# Patient Record
Sex: FEMALE | Race: OTHER | URBAN - METROPOLITAN AREA
[De-identification: names, ages, dates, MRNs, and addresses within clinical notes are randomized per-mention and may not be internally consistent; named-entity substitution may affect disease eponyms.]

---

## 2018-07-16 ENCOUNTER — OFFICE VISIT (OUTPATIENT)
Dept: FAMILY MEDICINE CLINIC | Age: 48
End: 2018-07-16

## 2018-07-16 VITALS
HEART RATE: 89 BPM | RESPIRATION RATE: 12 BRPM | SYSTOLIC BLOOD PRESSURE: 121 MMHG | TEMPERATURE: 98.4 F | BODY MASS INDEX: 26.62 KG/M2 | WEIGHT: 135.6 LBS | OXYGEN SATURATION: 97 % | HEIGHT: 60 IN | DIASTOLIC BLOOD PRESSURE: 82 MMHG

## 2018-07-16 DIAGNOSIS — Z01.419 WELL WOMAN EXAM: ICD-10-CM

## 2018-07-16 DIAGNOSIS — R35.0 URINARY FREQUENCY: ICD-10-CM

## 2018-07-16 DIAGNOSIS — Z01.419 WELL WOMAN EXAM: Primary | ICD-10-CM

## 2018-07-16 LAB
A/G RATIO: 1.7 (ref 1.1–2.2)
ALBUMIN SERPL-MCNC: 4.4 G/DL (ref 3.4–5)
ALP BLD-CCNC: 56 U/L (ref 40–129)
ALT SERPL-CCNC: 7 U/L (ref 10–40)
ANION GAP SERPL CALCULATED.3IONS-SCNC: 15 MMOL/L (ref 3–16)
AST SERPL-CCNC: 15 U/L (ref 15–37)
BILIRUB SERPL-MCNC: 0.5 MG/DL (ref 0–1)
BILIRUBIN URINE: NEGATIVE
BLOOD, URINE: ABNORMAL
BUN BLDV-MCNC: 11 MG/DL (ref 7–20)
CALCIUM SERPL-MCNC: 9.1 MG/DL (ref 8.3–10.6)
CHLORIDE BLD-SCNC: 100 MMOL/L (ref 99–110)
CHOLESTEROL, TOTAL: 198 MG/DL (ref 0–199)
CLARITY: CLEAR
CO2: 23 MMOL/L (ref 21–32)
COLOR: YELLOW
CREAT SERPL-MCNC: 0.6 MG/DL (ref 0.6–1.1)
EPITHELIAL CELLS, UA: 3 /HPF (ref 0–5)
GFR AFRICAN AMERICAN: >60
GFR NON-AFRICAN AMERICAN: >60
GLOBULIN: 2.6 G/DL
GLUCOSE BLD-MCNC: 86 MG/DL (ref 70–99)
GLUCOSE URINE: NEGATIVE MG/DL
HCT VFR BLD CALC: 39.1 % (ref 36–48)
HDLC SERPL-MCNC: 44 MG/DL (ref 40–60)
HEMOGLOBIN: 12.9 G/DL (ref 12–16)
HYALINE CASTS: 0 /LPF (ref 0–8)
KETONES, URINE: NEGATIVE MG/DL
LDL CHOLESTEROL CALCULATED: 106 MG/DL
LEUKOCYTE ESTERASE, URINE: NEGATIVE
MCH RBC QN AUTO: 30.2 PG (ref 26–34)
MCHC RBC AUTO-ENTMCNC: 33.1 G/DL (ref 31–36)
MCV RBC AUTO: 91.2 FL (ref 80–100)
MICROSCOPIC EXAMINATION: YES
NITRITE, URINE: NEGATIVE
PDW BLD-RTO: 13 % (ref 12.4–15.4)
PH UA: 6
PLATELET # BLD: 315 K/UL (ref 135–450)
PMV BLD AUTO: 7.6 FL (ref 5–10.5)
POTASSIUM SERPL-SCNC: 4.2 MMOL/L (ref 3.5–5.1)
PROTEIN UA: NEGATIVE MG/DL
RBC # BLD: 4.28 M/UL (ref 4–5.2)
RBC UA: 5 /HPF (ref 0–4)
SODIUM BLD-SCNC: 138 MMOL/L (ref 136–145)
SPECIFIC GRAVITY UA: 1.02
TOTAL PROTEIN: 7 G/DL (ref 6.4–8.2)
TRIGL SERPL-MCNC: 242 MG/DL (ref 0–150)
TSH SERPL DL<=0.05 MIU/L-ACNC: 2.29 UIU/ML (ref 0.27–4.2)
URINE TYPE: ABNORMAL
UROBILINOGEN, URINE: 0.2 E.U./DL
VLDLC SERPL CALC-MCNC: 48 MG/DL
WBC # BLD: 7.7 K/UL (ref 4–11)
WBC UA: 1 /HPF (ref 0–5)

## 2018-07-16 PROCEDURE — 99396 PREV VISIT EST AGE 40-64: CPT | Performed by: FAMILY MEDICINE

## 2018-07-16 ASSESSMENT — PATIENT HEALTH QUESTIONNAIRE - PHQ9
1. LITTLE INTEREST OR PLEASURE IN DOING THINGS: 0
2. FEELING DOWN, DEPRESSED OR HOPELESS: 0
SUM OF ALL RESPONSES TO PHQ9 QUESTIONS 1 & 2: 0
SUM OF ALL RESPONSES TO PHQ QUESTIONS 1-9: 0

## 2018-07-16 NOTE — PROGRESS NOTES
Wt 135 lb 9.6 oz (61.5 kg)   LMP 06/26/2018 (Approximate)   SpO2 97%   Breastfeeding? No   BMI 26.93 kg/m²   General appearance - healthy, alert, no distress  Skin - Skin color, texture, turgor normal. No rashes or lesions. No suspicious findings  Head - Normocephalic. No masses, lesions, tenderness or abnormalities  Eyes - conjunctivae/corneas clear. Pupils equal and reactive to light and accomodation, extraocular muscles intact. Ears - External ears normal. Canals clear. Tympanic membranes normal bilaterally. Nose/Sinuses - Nares normal. Septum midline. Mucosa normal. No drainage or sinus tenderness. Oropharynx - Lips, mucosa, and tongue normal. Teeth and gums normal.   Neck - Neck supple. No adenopathy. Thyroid symmetric, normal size, no carotid bruit bilaterally  Back - Back symmetric, no curvature. Range of motion normal. No Costovertebral angle tenderness. Lungs - Percussion normal. Good diaphragmatic excursion. Lungs clear without wheeze, rales, crackles  Heart - Regular rate and rhythm, with no rub, murmur or gallop noted. Abdomen - Abdomen soft, non-tender. Bowel sounds normal. No masses, tenderness or organomegaly  Breasts: breasts appear normal, no suspicious masses, no skin or nipple changes or axillary nodes. Self exam is encouraged. Pelvis: normal external genitalia, vulva, vagina, cervix, uterus and adnexa, PAP: Pap smear done today, thin-prep method, HPV test.   Extremities - Extremities normal. No deformities, edema, or skin discoloration  Musculoskeletal - Spine ROM normal. Muscular strength intact. Peripheral pulses - radial=4/4,, femoral=4/4, popliteal=4/4, dorsalis pedis=4/4,  Neuro - Gait normal. Reflexes normal and symmetric. Sensation grossly normal.  No focal weakness  Psych - euthymic, no suicidal thoughts or ideation, mood stable. Exam chaperoned by female assistant. ASSESSMENT / PLAN:    1.  Well woman exam  No focal abnormalities on exam  Anticipatory guidance

## 2018-07-17 LAB
ESTIMATED AVERAGE GLUCOSE: 96.8 MG/DL
HBA1C MFR BLD: 5 %

## 2018-07-18 LAB
HPV COMMENT: NORMAL
HPV TYPE 16: NOT DETECTED
HPV TYPE 18: NOT DETECTED
HPVOH (OTHER TYPES): NOT DETECTED

## 2019-07-18 ENCOUNTER — OFFICE VISIT (OUTPATIENT)
Dept: FAMILY MEDICINE CLINIC | Age: 49
End: 2019-07-18
Payer: COMMERCIAL

## 2019-07-18 VITALS
DIASTOLIC BLOOD PRESSURE: 87 MMHG | WEIGHT: 141.2 LBS | BODY MASS INDEX: 27.72 KG/M2 | SYSTOLIC BLOOD PRESSURE: 116 MMHG | OXYGEN SATURATION: 96 % | RESPIRATION RATE: 18 BRPM | TEMPERATURE: 98.9 F | HEIGHT: 60 IN | HEART RATE: 71 BPM

## 2019-07-18 DIAGNOSIS — H53.9 VISION CHANGES: ICD-10-CM

## 2019-07-18 DIAGNOSIS — Z01.419 WELL WOMAN EXAM: Primary | ICD-10-CM

## 2019-07-18 DIAGNOSIS — Z12.39 SCREENING FOR BREAST CANCER: ICD-10-CM

## 2019-07-18 DIAGNOSIS — R35.0 URINARY FREQUENCY: ICD-10-CM

## 2019-07-18 DIAGNOSIS — E78.1 HYPERTRIGLYCERIDEMIA: ICD-10-CM

## 2019-07-18 PROCEDURE — 99396 PREV VISIT EST AGE 40-64: CPT | Performed by: FAMILY MEDICINE

## 2019-07-22 DIAGNOSIS — R35.0 URINARY FREQUENCY: ICD-10-CM

## 2019-07-22 DIAGNOSIS — Z01.419 WELL WOMAN EXAM: ICD-10-CM

## 2019-07-22 LAB
A/G RATIO: 1.8 (ref 1.1–2.2)
ALBUMIN SERPL-MCNC: 4.3 G/DL (ref 3.4–5)
ALP BLD-CCNC: 61 U/L (ref 40–129)
ALT SERPL-CCNC: 7 U/L (ref 10–40)
ANION GAP SERPL CALCULATED.3IONS-SCNC: 15 MMOL/L (ref 3–16)
AST SERPL-CCNC: 15 U/L (ref 15–37)
BILIRUB SERPL-MCNC: 0.4 MG/DL (ref 0–1)
BILIRUBIN URINE: NEGATIVE
BLOOD, URINE: ABNORMAL
BUN BLDV-MCNC: 8 MG/DL (ref 7–20)
CALCIUM SERPL-MCNC: 9 MG/DL (ref 8.3–10.6)
CHLORIDE BLD-SCNC: 105 MMOL/L (ref 99–110)
CHOLESTEROL, TOTAL: 196 MG/DL (ref 0–199)
CLARITY: CLEAR
CO2: 22 MMOL/L (ref 21–32)
COLOR: YELLOW
CREAT SERPL-MCNC: <0.5 MG/DL (ref 0.6–1.1)
EPITHELIAL CELLS, UA: 6 /HPF (ref 0–5)
GFR AFRICAN AMERICAN: >60
GFR NON-AFRICAN AMERICAN: >60
GLOBULIN: 2.4 G/DL
GLUCOSE BLD-MCNC: 86 MG/DL (ref 70–99)
GLUCOSE URINE: NEGATIVE MG/DL
HCT VFR BLD CALC: 40.8 % (ref 36–48)
HDLC SERPL-MCNC: 44 MG/DL (ref 40–60)
HEMOGLOBIN: 13.2 G/DL (ref 12–16)
HPV COMMENT: NORMAL
HPV TYPE 16: NOT DETECTED
HPV TYPE 18: NOT DETECTED
HPVOH (OTHER TYPES): NOT DETECTED
HYALINE CASTS: 1 /LPF (ref 0–8)
KETONES, URINE: NEGATIVE MG/DL
LDL CHOLESTEROL CALCULATED: 107 MG/DL
LEUKOCYTE ESTERASE, URINE: NEGATIVE
MCH RBC QN AUTO: 29.3 PG (ref 26–34)
MCHC RBC AUTO-ENTMCNC: 32.3 G/DL (ref 31–36)
MCV RBC AUTO: 90.6 FL (ref 80–100)
MICROSCOPIC EXAMINATION: YES
NITRITE, URINE: NEGATIVE
PDW BLD-RTO: 13.3 % (ref 12.4–15.4)
PH UA: 6 (ref 5–8)
PLATELET # BLD: 283 K/UL (ref 135–450)
PMV BLD AUTO: 7.4 FL (ref 5–10.5)
POTASSIUM SERPL-SCNC: 3.9 MMOL/L (ref 3.5–5.1)
PROTEIN UA: NEGATIVE MG/DL
RBC # BLD: 4.51 M/UL (ref 4–5.2)
RBC UA: 0 /HPF (ref 0–4)
SODIUM BLD-SCNC: 142 MMOL/L (ref 136–145)
SPECIFIC GRAVITY UA: 1.02 (ref 1–1.03)
TOTAL PROTEIN: 6.7 G/DL (ref 6.4–8.2)
TRIGL SERPL-MCNC: 227 MG/DL (ref 0–150)
TSH SERPL DL<=0.05 MIU/L-ACNC: 2.26 UIU/ML (ref 0.27–4.2)
URINE TYPE: ABNORMAL
UROBILINOGEN, URINE: 0.2 E.U./DL
VLDLC SERPL CALC-MCNC: 45 MG/DL
WBC # BLD: 7 K/UL (ref 4–11)
WBC UA: 2 /HPF (ref 0–5)

## 2019-07-23 LAB
ESTIMATED AVERAGE GLUCOSE: 102.5 MG/DL
HBA1C MFR BLD: 5.2 %

## 2020-07-22 ENCOUNTER — OFFICE VISIT (OUTPATIENT)
Dept: FAMILY MEDICINE CLINIC | Age: 50
End: 2020-07-22
Payer: COMMERCIAL

## 2020-07-22 VITALS
RESPIRATION RATE: 12 BRPM | DIASTOLIC BLOOD PRESSURE: 81 MMHG | TEMPERATURE: 98 F | HEART RATE: 83 BPM | WEIGHT: 138.8 LBS | SYSTOLIC BLOOD PRESSURE: 122 MMHG | BODY MASS INDEX: 27.25 KG/M2 | HEIGHT: 60 IN

## 2020-07-22 DIAGNOSIS — Z00.00 ROUTINE GENERAL MEDICAL EXAMINATION AT A HEALTH CARE FACILITY: ICD-10-CM

## 2020-07-22 LAB
BILIRUBIN URINE: NEGATIVE
BLOOD, URINE: NEGATIVE
CLARITY: CLEAR
COLOR: YELLOW
EPITHELIAL CELLS, UA: 7 /HPF (ref 0–5)
GLUCOSE URINE: NEGATIVE MG/DL
HCT VFR BLD CALC: 38.6 % (ref 36–48)
HEMOGLOBIN: 13 G/DL (ref 12–16)
HYALINE CASTS: 1 /LPF (ref 0–8)
KETONES, URINE: NEGATIVE MG/DL
LEUKOCYTE ESTERASE, URINE: ABNORMAL
MCH RBC QN AUTO: 30.5 PG (ref 26–34)
MCHC RBC AUTO-ENTMCNC: 33.6 G/DL (ref 31–36)
MCV RBC AUTO: 90.8 FL (ref 80–100)
MICROSCOPIC EXAMINATION: YES
NITRITE, URINE: NEGATIVE
PDW BLD-RTO: 12.8 % (ref 12.4–15.4)
PH UA: 6.5 (ref 5–8)
PLATELET # BLD: 279 K/UL (ref 135–450)
PMV BLD AUTO: 7.3 FL (ref 5–10.5)
PROTEIN UA: NEGATIVE MG/DL
RBC # BLD: 4.26 M/UL (ref 4–5.2)
RBC UA: 5 /HPF (ref 0–4)
SPECIFIC GRAVITY UA: 1.01 (ref 1–1.03)
URINE TYPE: ABNORMAL
UROBILINOGEN, URINE: 0.2 E.U./DL
WBC # BLD: 6.2 K/UL (ref 4–11)
WBC UA: 2 /HPF (ref 0–5)

## 2020-07-22 PROCEDURE — 99396 PREV VISIT EST AGE 40-64: CPT | Performed by: FAMILY MEDICINE

## 2020-07-22 ASSESSMENT — PATIENT HEALTH QUESTIONNAIRE - PHQ9
SUM OF ALL RESPONSES TO PHQ9 QUESTIONS 1 & 2: 0
1. LITTLE INTEREST OR PLEASURE IN DOING THINGS: 0
SUM OF ALL RESPONSES TO PHQ QUESTIONS 1-9: 0
SUM OF ALL RESPONSES TO PHQ QUESTIONS 1-9: 0
2. FEELING DOWN, DEPRESSED OR HOPELESS: 0

## 2020-07-22 NOTE — PROGRESS NOTES
Here for well checkup, physical.  Pt is staying home most of the time, getting out rarely to go to grocery but otherwise staying home. Will go to [employer] every other Sunday. Pt is doing ok with the stress, denies any new issues or concerns. Pt states [facility] is starting virtual visits/evaluation. Pt is staying busy, but is managing the stress associated with COVID  Pandemic. Pt denies any chest pain, shortness of breath. Pt doing well to stay active, denies any exertional chest pain, shortness of breath. Mood doing well. No abd pain, no dizziness, no nausea/vomiting, bowel changes      Except as noted in the history of present illness as above, the review of systems is negative for the following:    General ROS: negative  Psychological ROS: negative  Allergy and Immunology ROS: negative  Hematological and Lymphatic ROS: negative  Respiratory ROS: no cough, shortness of breath, or wheezing  Cardiovascular ROS: no chest pain or dyspnea on exertion  Gastrointestinal ROS: no abdominal pain, change in bowel habits, or black or bloody stools  Genito-Urinary ROS: no dysuria, trouble voiding, or hematuria  Musculoskeletal ROS: negative  Dermatological ROS: negative      Past medical, surgical, and social history reviewed and updated. Medications and allergies reviewed and updated      /81   Pulse 83   Temp 98 °F (36.7 °C) (Temporal)   Resp 12   Ht 4' 11.5\" (1.511 m)   Wt 138 lb 12.8 oz (63 kg)   LMP 07/04/2020 (Exact Date)   BMI 27.57 kg/m²   General appearance - healthy, alert, no distress  Skin - Skin color, texture, turgor normal. No rashes or lesions. No suspicious findings  Head - Normocephalic. No masses, lesions, tenderness or abnormalities  Eyes - conjunctivae/corneas clear. Pupils equal and reactive to light and accomodation, extraocular muscles intact. Ears - External ears normal. Canals clear. Tympanic membranes normal bilaterally. Nose/Sinuses - Nares normal. Septum midline.  Mucosa

## 2020-07-23 LAB
A/G RATIO: 1.7 (ref 1.1–2.2)
ALBUMIN SERPL-MCNC: 4.5 G/DL (ref 3.4–5)
ALP BLD-CCNC: 62 U/L (ref 40–129)
ALT SERPL-CCNC: 9 U/L (ref 10–40)
ANION GAP SERPL CALCULATED.3IONS-SCNC: 11 MMOL/L (ref 3–16)
AST SERPL-CCNC: 18 U/L (ref 15–37)
BILIRUB SERPL-MCNC: 0.4 MG/DL (ref 0–1)
BUN BLDV-MCNC: 9 MG/DL (ref 7–20)
CALCIUM SERPL-MCNC: 9.1 MG/DL (ref 8.3–10.6)
CHLORIDE BLD-SCNC: 99 MMOL/L (ref 99–110)
CHOLESTEROL, TOTAL: 232 MG/DL (ref 0–199)
CO2: 28 MMOL/L (ref 21–32)
CREAT SERPL-MCNC: 0.6 MG/DL (ref 0.6–1.1)
ESTIMATED AVERAGE GLUCOSE: 96.8 MG/DL
GFR AFRICAN AMERICAN: >60
GFR NON-AFRICAN AMERICAN: >60
GLOBULIN: 2.7 G/DL
GLUCOSE BLD-MCNC: 94 MG/DL (ref 70–99)
HBA1C MFR BLD: 5 %
HDLC SERPL-MCNC: 49 MG/DL (ref 40–60)
LDL CHOLESTEROL CALCULATED: 159 MG/DL
POTASSIUM SERPL-SCNC: 4 MMOL/L (ref 3.5–5.1)
SODIUM BLD-SCNC: 138 MMOL/L (ref 136–145)
TOTAL PROTEIN: 7.2 G/DL (ref 6.4–8.2)
TRIGL SERPL-MCNC: 122 MG/DL (ref 0–150)
TSH SERPL DL<=0.05 MIU/L-ACNC: 2.83 UIU/ML (ref 0.27–4.2)
VLDLC SERPL CALC-MCNC: 24 MG/DL

## 2020-07-28 RX ORDER — FLUCONAZOLE 150 MG/1
150 TABLET ORAL ONCE
Qty: 1 TABLET | Refills: 0 | Status: SHIPPED | OUTPATIENT
Start: 2020-07-28 | End: 2020-07-28

## 2021-04-27 ENCOUNTER — OFFICE VISIT (OUTPATIENT)
Dept: FAMILY MEDICINE CLINIC | Age: 51
End: 2021-04-27
Payer: COMMERCIAL

## 2021-04-27 VITALS
HEART RATE: 66 BPM | HEIGHT: 60 IN | OXYGEN SATURATION: 98 % | WEIGHT: 141.4 LBS | SYSTOLIC BLOOD PRESSURE: 128 MMHG | BODY MASS INDEX: 27.76 KG/M2 | RESPIRATION RATE: 16 BRPM | DIASTOLIC BLOOD PRESSURE: 80 MMHG | TEMPERATURE: 97.3 F

## 2021-04-27 DIAGNOSIS — Z23 NEED FOR SHINGLES VACCINE: ICD-10-CM

## 2021-04-27 DIAGNOSIS — Z23 NEED FOR VACCINATION: ICD-10-CM

## 2021-04-27 DIAGNOSIS — Z01.419 WELL WOMAN EXAM: ICD-10-CM

## 2021-04-27 DIAGNOSIS — Z23 NEED FOR DIPHTHERIA-TETANUS-PERTUSSIS (TDAP) VACCINE: ICD-10-CM

## 2021-04-27 DIAGNOSIS — Z13.6 SCREENING, ISCHEMIC HEART DISEASE: ICD-10-CM

## 2021-04-27 DIAGNOSIS — R35.0 URINARY FREQUENCY: Primary | ICD-10-CM

## 2021-04-27 DIAGNOSIS — E78.00 HYPERCHOLESTEROLEMIA: ICD-10-CM

## 2021-04-27 LAB
A/G RATIO: 1.7 (ref 1.1–2.2)
ALBUMIN SERPL-MCNC: 4.3 G/DL (ref 3.4–5)
ALP BLD-CCNC: 72 U/L (ref 40–129)
ALT SERPL-CCNC: 9 U/L (ref 10–40)
ANION GAP SERPL CALCULATED.3IONS-SCNC: 11 MMOL/L (ref 3–16)
AST SERPL-CCNC: 19 U/L (ref 15–37)
BILIRUB SERPL-MCNC: 0.5 MG/DL (ref 0–1)
BILIRUBIN URINE: NEGATIVE
BLOOD, URINE: ABNORMAL
BUN BLDV-MCNC: 10 MG/DL (ref 7–20)
CALCIUM SERPL-MCNC: 8.8 MG/DL (ref 8.3–10.6)
CHLORIDE BLD-SCNC: 102 MMOL/L (ref 99–110)
CHOLESTEROL, TOTAL: 207 MG/DL (ref 0–199)
CLARITY: CLEAR
CO2: 27 MMOL/L (ref 21–32)
COLOR: YELLOW
CREAT SERPL-MCNC: 0.6 MG/DL (ref 0.6–1.1)
EPITHELIAL CELLS, UA: 1 /HPF (ref 0–5)
GFR AFRICAN AMERICAN: >60
GFR NON-AFRICAN AMERICAN: >60
GLOBULIN: 2.5 G/DL
GLUCOSE BLD-MCNC: 80 MG/DL (ref 70–99)
GLUCOSE URINE: NEGATIVE MG/DL
HCT VFR BLD CALC: 38 % (ref 36–48)
HDLC SERPL-MCNC: 50 MG/DL (ref 40–60)
HEMOGLOBIN: 13.1 G/DL (ref 12–16)
HYALINE CASTS: 0 /LPF (ref 0–8)
KETONES, URINE: NEGATIVE MG/DL
LDL CHOLESTEROL CALCULATED: 123 MG/DL
LEUKOCYTE ESTERASE, URINE: NEGATIVE
MCH RBC QN AUTO: 30.5 PG (ref 26–34)
MCHC RBC AUTO-ENTMCNC: 34.4 G/DL (ref 31–36)
MCV RBC AUTO: 88.4 FL (ref 80–100)
MICROSCOPIC EXAMINATION: YES
NITRITE, URINE: NEGATIVE
PDW BLD-RTO: 13.2 % (ref 12.4–15.4)
PH UA: 6.5 (ref 5–8)
PLATELET # BLD: 249 K/UL (ref 135–450)
PMV BLD AUTO: 7.5 FL (ref 5–10.5)
POTASSIUM SERPL-SCNC: 3.8 MMOL/L (ref 3.5–5.1)
PROTEIN UA: NEGATIVE MG/DL
RBC # BLD: 4.29 M/UL (ref 4–5.2)
RBC UA: 1 /HPF (ref 0–4)
SODIUM BLD-SCNC: 140 MMOL/L (ref 136–145)
SPECIFIC GRAVITY UA: 1.01 (ref 1–1.03)
TOTAL PROTEIN: 6.8 G/DL (ref 6.4–8.2)
TRIGL SERPL-MCNC: 171 MG/DL (ref 0–150)
TSH SERPL DL<=0.05 MIU/L-ACNC: 3.77 UIU/ML (ref 0.27–4.2)
URINE TYPE: ABNORMAL
UROBILINOGEN, URINE: 0.2 E.U./DL
VLDLC SERPL CALC-MCNC: 34 MG/DL
WBC # BLD: 5.8 K/UL (ref 4–11)
WBC UA: 0 /HPF (ref 0–5)

## 2021-04-27 PROCEDURE — 99396 PREV VISIT EST AGE 40-64: CPT | Performed by: FAMILY MEDICINE

## 2021-04-27 ASSESSMENT — PATIENT HEALTH QUESTIONNAIRE - PHQ9
2. FEELING DOWN, DEPRESSED OR HOPELESS: 0
SUM OF ALL RESPONSES TO PHQ QUESTIONS 1-9: 0
SUM OF ALL RESPONSES TO PHQ9 QUESTIONS 1 & 2: 0
SUM OF ALL RESPONSES TO PHQ QUESTIONS 1-9: 0

## 2021-04-27 NOTE — PROGRESS NOTES
Here for well checkup, physical.  Pt states that things are doing well. Daughter is just finishing her freshman year at [employer] but has been home the whole time. She did go up a few times for a lab once a month but otherwise was home. Pt is feeling well, denies any new issues of chest pain. Pt states that she is not having any new issues of chest pain, shortness of breath. No issues of dysuria, but feels like it takes longer to get the stream going. Sx are most of the time. Sx are mild. No nocturia, and no frequency. No urgency. No blood in urine. Except as noted in the history of present illness as above, the review of systems is negative for the following:    General ROS: negative  Psychological ROS: negative  Allergy and Immunology ROS: negative  Hematological and Lymphatic ROS: negative  Respiratory ROS: no cough, shortness of breath, or wheezing  Cardiovascular ROS: no chest pain or dyspnea on exertion  Gastrointestinal ROS: no abdominal pain, change in bowel habits, or black or bloody stools  Genito-Urinary ROS: no dysuria, trouble voiding, or hematuria  Musculoskeletal ROS: negative  Dermatological ROS: negative      Past medical, surgical, and social history reviewed and updated. Medications and allergies reviewed and updated      /80   Pulse 66   Temp 97.3 °F (36.3 °C) (Temporal)   Resp 16   Ht 4' 11.5\" (1.511 m)   Wt 141 lb 6.4 oz (64.1 kg)   SpO2 98%   BMI 28.08 kg/m²   General appearance - healthy, alert, no distress  Skin - Skin color, texture, turgor normal. No rashes or lesions. No suspicious findings  Head - Normocephalic. No masses, lesions, tenderness or abnormalities  Eyes - conjunctivae/corneas clear. Pupils equal and reactive to light and accomodation, extraocular muscles intact. Ears - External ears normal. Canals clear. Tympanic membranes normal bilaterally. Nose/Sinuses - Nares normal. Septum midline. Mucosa normal. No drainage or sinus tenderness.   Oropharynx - Lips, mucosa, and tongue normal. Teeth and gums normal.   Neck - Neck supple. No adenopathy. Thyroid symmetric, normal size, no carotid bruit bilaterally  Back - Back symmetric, no curvature. Range of motion normal. No Costovertebral angle tenderness. Lungs - Percussion normal. Good diaphragmatic excursion. Lungs clear without wheeze, rales, crackles  Heart - Regular rate and rhythm, with no rub, murmur or gallop noted. Abdomen - Abdomen soft, non-tender. Bowel sounds normal. No masses, tenderness or organomegaly  Breasts: breasts appear normal, no suspicious masses, no skin or nipple changes or axillary nodes. Self exam is encouraged. Pelvis: normal external genitalia, vulva, vagina, cervix, uterus and adnexa, PAP: Pap smear done today, thin-prep method, HPV test.   Extremities - Extremities normal. No deformities, edema, or skin discoloration  Musculoskeletal - Spine ROM normal. Muscular strength intact. Peripheral pulses - radial=4/4,, femoral=4/4, popliteal=4/4, dorsalis pedis=4/4,  Neuro - Gait normal. Reflexes normal and symmetric. Sensation grossly normal.  No focal weakness  Psych - euthymic, no suicidal thoughts or ideation, mood stable. Exam chaperoned by female assistant. ASSESSMENT / PLAN:    1. Well woman exam  No focal abnormalities on exam  Anticipatory guidance discussed. Check bloodwork  Pap/pelvic/breast exam today  - CBC; Future  - Comprehensive Metabolic Panel; Future  - Lipid Panel; Future  - Hemoglobin A1C; Future  - TSH without Reflex; Future  - PAP SMEAR    2. Screening, ischemic heart disease  - CT CARDIAC CALCIUM SCORING; Future    3. Urinary frequency  Exam nonfocal, prior UA clear  Check bmp  Management pending results. 4. Hypercholesterolemia  Check cmp, lipids  Check CT calcium score    5. Need for vaccination  Encouraged COVID vaccination    6.  Need for diphtheria-tetanus-pertussis (Tdap) vaccine  Encouraged to get done, will consider  Aware need to get done 30d before/after COVID vaccine    7. Need for shingles vaccine  Encouraged to get done, will consider  Aware need to get done 30d before/after COVID vaccine           Follow-up appointment:   Pending bloodwork results/prn    Discussed use, benefit, and side effects of all prescribed medications. Barriers to medication compliance addressed. All patient questions answered. Pt voiced understanding. When applicable, patient's outside records were reviewed through [facility]. The patient has signed appropriate paperworks/consents.

## 2021-04-28 LAB
ESTIMATED AVERAGE GLUCOSE: 96.8 MG/DL
HBA1C MFR BLD: 5 %
HPV COMMENT: NORMAL
HPV TYPE 16: NOT DETECTED
HPV TYPE 18: NOT DETECTED
HPVOH (OTHER TYPES): NOT DETECTED

## 2022-10-19 ENCOUNTER — TELEPHONE (OUTPATIENT)
Dept: FAMILY MEDICINE CLINIC | Age: 52
End: 2022-10-19

## 2022-10-19 NOTE — TELEPHONE ENCOUNTER
----- Message from Elizabeth Heavenjose sent at 10/19/2022  8:37 AM EDT -----  Subject: Referral Request    Reason for referral request? Orthopedic   Provider patient wants to be referred to(if known):     Provider Phone Number(if known): Additional Information for Provider? Patient broke her pinky toe on her   left foot in [place] back in July.  It hasn't fully healed and still   giving pain  ---------------------------------------------------------------------------  --------------  Nano Newman INFO    [phone]; OK to leave message on voicemail  ---------------------------------------------------------------------------  --------------

## 2022-10-21 ENCOUNTER — OFFICE VISIT (OUTPATIENT)
Dept: FAMILY MEDICINE CLINIC | Age: 52
End: 2022-10-21
Payer: COMMERCIAL

## 2022-10-21 VITALS
DIASTOLIC BLOOD PRESSURE: 74 MMHG | OXYGEN SATURATION: 98 % | WEIGHT: 146 LBS | HEART RATE: 78 BPM | BODY MASS INDEX: 28.66 KG/M2 | SYSTOLIC BLOOD PRESSURE: 128 MMHG | HEIGHT: 60 IN

## 2022-10-21 DIAGNOSIS — Z23 NEED FOR SHINGLES VACCINE: ICD-10-CM

## 2022-10-21 DIAGNOSIS — R35.0 URINARY FREQUENCY: ICD-10-CM

## 2022-10-21 DIAGNOSIS — E78.00 HYPERCHOLESTEROLEMIA: ICD-10-CM

## 2022-10-21 DIAGNOSIS — Z23 NEEDS FLU SHOT: ICD-10-CM

## 2022-10-21 DIAGNOSIS — G89.29 CHRONIC FOOT PAIN, LEFT: ICD-10-CM

## 2022-10-21 DIAGNOSIS — Z01.419 WELL WOMAN EXAM: Primary | ICD-10-CM

## 2022-10-21 DIAGNOSIS — Z12.31 ENCOUNTER FOR SCREENING MAMMOGRAM FOR MALIGNANT NEOPLASM OF BREAST: ICD-10-CM

## 2022-10-21 DIAGNOSIS — M79.672 CHRONIC FOOT PAIN, LEFT: ICD-10-CM

## 2022-10-21 PROCEDURE — 99396 PREV VISIT EST AGE 40-64: CPT | Performed by: FAMILY MEDICINE

## 2022-10-21 SDOH — ECONOMIC STABILITY: FOOD INSECURITY: WITHIN THE PAST 12 MONTHS, YOU WORRIED THAT YOUR FOOD WOULD RUN OUT BEFORE YOU GOT MONEY TO BUY MORE.: NEVER TRUE

## 2022-10-21 SDOH — ECONOMIC STABILITY: FOOD INSECURITY: WITHIN THE PAST 12 MONTHS, THE FOOD YOU BOUGHT JUST DIDN'T LAST AND YOU DIDN'T HAVE MONEY TO GET MORE.: NEVER TRUE

## 2022-10-21 ASSESSMENT — PATIENT HEALTH QUESTIONNAIRE - PHQ9
SUM OF ALL RESPONSES TO PHQ QUESTIONS 1-9: 0
DEPRESSION UNABLE TO ASSESS: FUNCTIONAL CAPACITY MOTIVATION LIMITS ACCURACY
2. FEELING DOWN, DEPRESSED OR HOPELESS: 0
1. LITTLE INTEREST OR PLEASURE IN DOING THINGS: 0
SUM OF ALL RESPONSES TO PHQ9 QUESTIONS 1 & 2: 0
SUM OF ALL RESPONSES TO PHQ QUESTIONS 1-9: 0

## 2022-10-21 ASSESSMENT — SOCIAL DETERMINANTS OF HEALTH (SDOH): HOW HARD IS IT FOR YOU TO PAY FOR THE VERY BASICS LIKE FOOD, HOUSING, MEDICAL CARE, AND HEATING?: NOT HARD AT ALL

## 2022-10-21 NOTE — PROGRESS NOTES
Here for well checkup, physical.  Pt states that things are doing well. Pt states that she had to go to [address] [place] [address] for [employer].  Pt was tehre and suffered a fall, and injured her left foot. Pt dx with fracture of 5th metatarsal and ligament damage. Pt did not need surgery. Pt states that if she walks a lot, gets pain so is using a scooter. Pt gets swelling and pain. Not taking anything except calcium. It has been 3 months and still with pain with ambulating. Pt wearing boot and also scooter. Except as noted in the history of present illness as above, the review of systems is negative for the following:    General ROS: negative  Psychological ROS: negative  Allergy and Immunology ROS: negative  Hematological and Lymphatic ROS: negative  Respiratory ROS: no cough, shortness of breath, or wheezing  Cardiovascular ROS: no chest pain or dyspnea on exertion  Gastrointestinal ROS: no abdominal pain, change in bowel habits, or black or bloody stools  Genito-Urinary ROS: no dysuria, trouble voiding, or hematuria  Musculoskeletal ROS: negative  Dermatological ROS: negative      Past medical, surgical, and social history reviewed and updated. Medications and allergies reviewed and updated      /74   Pulse 78   Ht 4' 11.5\" (1.511 m)   Wt 146 lb (66.2 kg)   SpO2 98%   BMI 28.99 kg/m²   General appearance - healthy, alert, no distress  Skin - Skin color, texture, turgor normal. No rashes or lesions. No suspicious findings  Head - Normocephalic. No masses, lesions, tenderness or abnormalities  Eyes - conjunctivae/corneas clear. Pupils equal and reactive to light and accomodation, extraocular muscles intact. Ears - External ears normal. Canals clear. Tympanic membranes normal bilaterally. Nose/Sinuses - Nares normal. Septum midline. Mucosa normal. No drainage or sinus tenderness. Oropharynx - Lips, mucosa, and tongue normal. Teeth and gums normal.   Neck - Neck supple. No adenopathy.  Thyroid symmetric, normal size, no carotid bruit bilaterally  Back - Back symmetric, no curvature. Range of motion normal. No Costovertebral angle tenderness. Lungs - Percussion normal. Good diaphragmatic excursion. Lungs clear without wheeze, rales, crackles  Heart - Regular rate and rhythm, with no rub, murmur or gallop noted. Abdomen - Abdomen soft, non-tender. Bowel sounds normal. No masses, tenderness or organomegaly  Breasts: breasts appear normal, no suspicious masses, no skin or nipple changes or axillary nodes. Self exam is encouraged. Pelvis: normal external genitalia, vulva, vagina, cervix, uterus and adnexa, PAP: Pap smear done today, thin-prep method, HPV test.   Extremities - Extremities normal. No deformities, edema, or skin discoloration  Musculoskeletal - Spine ROM normal. Muscular strength intact. Peripheral pulses - radial=4/4,, femoral=4/4, popliteal=4/4, dorsalis pedis=4/4,  Neuro - Gait normal. Reflexes normal and symmetric. Sensation grossly normal.  No focal weakness  Psych - euthymic, no suicidal thoughts or ideation, mood stable. Exam chaperoned by female assistant. ASSESSMENT / PLAN:    1. Well woman exam  No focal abnormalities on exam  Anticipatory guidance discussed. Pap/pelvic/breast exam done today  Check bloodwork as below  - CBC; Future  - Comprehensive Metabolic Panel; Future  - Lipid Panel; Future  - TSH; Future  - Hemoglobin A1C; Future  - PAP SMEAR  - Urinalysis; Future    2. Hypercholesterolemia  Check cmp, lipids  - Comprehensive Metabolic Panel; Future  - Lipid Panel; Future    3. Need for shingles vaccine  Pt is due for vaccination for shingles. Pt is given information on Shingrix vaccine, and the need for 2 doses spaced 2-6 months apart. Pt encouraged to check with insurance on coverage of vaccination and aware that if they have primary medicare coverage, that the shingles vaccination is not covered in office and they need to get done through the pharmacy. Will call to set up appt    4. Encounter for screening mammogram for malignant neoplasm of breast  Due f/u mammo  Order given  - [facility] DIGITAL SCREEN W OR WO CAD BILATERAL; Future    5. Chronic foot pain, left  S/p fx, in [address] supportive therapy  Refer to dr. Jean Jefferson for joanne Garibay MD, Orthopedic Surgery (Foot, Ankle), [facility]    6. Needs flu shot  Will set up    7. Urinary frequency  Exam nonfocal  Check UA  Check bloodwork   - Urinalysis; Future           Follow-up appointment:   Pending pap result  Pending bloodwork  Prn     Discussed use, benefit, and side effects of all prescribed medications. Barriers to medication compliance addressed. All patient questions answered. Pt voiced understanding. When applicable, patient's outside records were reviewed through [facility]. The patient has signed appropriate paperworks/consents.

## 2022-11-08 ENCOUNTER — OFFICE VISIT (OUTPATIENT)
Dept: ORTHOPEDIC SURGERY | Age: 52
End: 2022-11-08
Payer: COMMERCIAL

## 2022-11-08 VITALS — HEIGHT: 60 IN | BODY MASS INDEX: 28.66 KG/M2 | WEIGHT: 146 LBS

## 2022-11-08 DIAGNOSIS — S92.352A DISPLACED FRACTURE OF FIFTH METATARSAL BONE, LEFT FOOT, INITIAL ENCOUNTER FOR CLOSED FRACTURE: Primary | ICD-10-CM

## 2022-11-08 DIAGNOSIS — M79.672 LEFT FOOT PAIN: ICD-10-CM

## 2022-11-08 PROCEDURE — 99243 OFF/OP CNSLTJ NEW/EST LOW 30: CPT | Performed by: ORTHOPAEDIC SURGERY

## 2022-11-08 NOTE — PROGRESS NOTES
CHIEF COMPLAINT: Left foot pain/ 5th MT base fracture. DATE OF INJURY: 2022    HISTORY:  Ms. Olvin Viveros 46 y.o. [address] [place] [address] female  presents today for consultation request from Guera Walker MD for evaluation of a left foot injury which occurred when she fell rolling her left foot while walking. She was in [address] [place] [address] at the time with family. She is complaining of lateral foot pain and swelling. Rates pain a 4/10 VAS. This is better with elevation and worse with bearing any wt. The pain is sharp and not radiating. No other complaint. She was seen 1st at an ER in [address] [place] [address], where she was x-rayed and splinted and asked to f/u with orthopaedics. She was told that it would take at least 6 months to heal and has been nonweightbearing since. She is currently not working. Denies smoking.     Past Medical History:   Diagnosis Date    Allergic rhinitis 10/25/2016    Hypercholesterolemia 2021       Past Surgical History:   Procedure Laterality Date     SECTION      x 2       Social History     Socioeconomic History    Marital status:      Spouse name: Not on file    Number of children: Not on file    Years of education: Not on file    Highest education level: Not on file   Occupational History    Not on file   Tobacco Use    Smoking status: Never    Smokeless tobacco: Never   Substance and Sexual Activity    Alcohol use: No    Drug use: Not on file    Sexual activity: Not on file   Other Topics Concern    Not on file   Social History Narrative    Not on file     Social Determinants of Health     Financial Resource Strain: Low Risk     Difficulty of Paying Living Expenses: Not hard at all   Food Insecurity: No Food Insecurity    Worried About Running Out of Food in the Last Year: Never true    Ran Out of Food in the Last Year: Never true   Transportation Needs: Not on file   Physical Activity: Not on file   Stress: Not on file   Social Connections: Not on file   Intimate Partner Violence: Not on file   Housing Stability: Not on file       Family History   Problem Relation Age of Onset    Diabetes Father     Hypertension Father     Stomach Cancer Paternal Grandfather     Coronary Art Dis Mother     Hypotension Mother        No current outpatient medications on file prior to visit. No current facility-administered medications on file prior to visit. Pertinent items are noted in HPI  Review of systems reviewed from Patient History Form and available in the patient's chart under the Media tab. PHYSICAL EXAMINATION:  Ms. Gabriela Contreras is a very pleasant 46 y.o. [facility] [address] female who presents today in no acute distress, awake, alert, and oriented. She is well dressed, nourished and  groomed. Patient with normal affect. Height is  4' 11.5\" (1.511 m), weight is 146 lb (66.2 kg), Body mass index is 28.99 kg/m². Resting respiratory rate is 16. Examination of the gait, showed that the patient walks with a knee scooter, NWB left  leg . Examination of both ankles showing a decreased range of motion of the left ankle compare to the other side because of foot pain. There is moderate swelling that can be seen, no ecchymosis over lateral side of the left foot. She  has intact sensation and good pedal pulses. She has moderate tenderness on deep palpation over the 5th MT base left foot        IMAGING: Xray's were reviewed, dated today in office,  3 views of the left  foot, and showed a healing 5th MT base fracture. Significant osteopenia. IMPRESSION: Left 5th MT base fracture, healing well. PLAN: I discussed that the overall alignment of this fracture is good and at this point recommend weightbearing as tolerated and gradually returning to normal activities. I discussed with the patient that I think that she would really benefit from a course of physical therapy for further strengthening and stretching. An Rx for physical therapy was given to the patient.  We will see her  back in 6 weeks at which time we will get a new xray of the left foot standing. Thank you very much for the kind consultation and allowing me to participate in this patient's care. I will continue to keep you apprised of her progress.         Caden Samuels MD

## 2022-11-11 ENCOUNTER — HOSPITAL ENCOUNTER (OUTPATIENT)
Dept: PHYSICAL THERAPY | Age: 52
Setting detail: THERAPIES SERIES
Discharge: HOME | End: 2022-11-11
Payer: COMMERCIAL

## 2022-11-11 PROCEDURE — 97161 PT EVAL LOW COMPLEX 20 MIN: CPT

## 2022-11-11 PROCEDURE — 97530 THERAPEUTIC ACTIVITIES: CPT

## 2022-11-11 PROCEDURE — 97110 THERAPEUTIC EXERCISES: CPT

## 2022-11-11 NOTE — FLOWSHEET NOTE
[address] [facility]  Phone: [phone]   Fax: [phone]    Physical Therapy Daily Treatment Note    Date:  2022     Patient Name:  Adilene Albright    :  1970  MRN: [id number]  Medical Diagnosis:  Left foot pain [M79.672]  Displaced fracture of fifth metatarsal bone, left foot, initial encounter for closed fracture [S92.352A]  Treatment Diagnosis: L foot pain, decreased L ankle ROM and strength, impaired gait and ability to climb stairs, decreased standing and walking tolerance, L ankle swelling  Insurance/Certification information:  PT Insurance Information: BCBS: all codes billable, $30 copay, no coinsurance, 50 visit limit combined per lisa year, hard max, none used, no auth  Physician Information:  Brayden Arboleda MD    Plan of care signed (Y/N): []  Yes [x]  No     Date of Patient follow up with Physician:      Progress Report: []  Yes  [x]  No     Date Range for reporting period:  Beginnin2022  Ending:     Progress report due (10 Rx/or 30 days whichever is less): visit #10 or       Recertification due (POC duration/ or 90 days whichever is less): visit #18 or 23     Visit # Insurance Allowable Auth required?  Date Range    50 combined  Hard max []  Yes  [x]  No N/a       Latex Allergy:  [x]NO      []YES  Preferred Language for Healthcare:   [x]English       []other:    Functional Scale:           Date assessed:  FOTO physical FS primary measure score = NPV; risk adjusted = NPV  NPV    Pain level:  3-5/10     SUBJECTIVE:  See eval    OBJECTIVE: See eval      RESTRICTIONS/PRECAUTIONS: 5th met fracture = 22    Exercises/Interventions:     Therapeutic Exercises (59856) Resistance / level Sets/sec Reps Notes   Bike       IB - gastroc  IB- soleus        HR - neutral                                                 Therapeutic Activities (22621)                                   Gait (40541)       Normal gait pattern Stairs - step ups fwd       Stairs - step ups lat       Stairs - fwd step downs              Neuromuscular Re-ed (49679)       Single leg balance                                          Manual Intervention (46909)       PROM L ankle                                               Modalities: 11/11: good candidate for vaso    Pt. Education:  11/11/2022  -patient educated on diagnosis, prognosis and expectations for rehab  -all patient questions were answered  -educated on compression (wear, wash, what to look out for when wearing), ice, coming down stairs in neutral instead of sideways     Home Exercise Program:  Access Code: [email or web address]  URL: [employer]/  Date: 11/11/2022  Prepared by: Miguel Ángel Spear     Exercises  Supine Heel Slide - 2-3 x daily - 7 x weekly - 1 sets - 10 reps  Supine Quad Set - 2-3 x daily - 7 x weekly - 3 sets - 10 reps  Seated Heel Raise - 2-3 x daily - 7 x weekly - 3 sets - 10 reps  Seated Toe Raise - 2-3 x daily - 7 x weekly - 3 sets - 10 reps  Seated Ankle Circles - 2-3 x daily - 7 x weekly - 3 sets - 10 reps  Towel Scrunches - 2-3 x daily - 7 x weekly - 3 sets - 10 reps  Ankle Inversion Eversion Towel Slide - 2-3 x daily - 7 x weekly - 3 sets - 10       Therapeutic Exercise and NMR EXR  [x] (08297) Provided verbal/tactile cueing for activities related to strengthening, flexibility, endurance, ROM for improvements in  [x] LE / Lumbar: LE, proximal hip, and core control with self care, mobility, lifting, ambulation. [] UE / Cervical: cervical, postural, scapular, scapulothoracic and UE control with self care, reaching, carrying, lifting, house/yardwork, driving, computer work.  [] (22442) Provided verbal/tactile cueing for activities related to improving balance, coordination, kinesthetic sense, posture, motor skill, proprioception to assist with   [] LE / lumbar: LE, proximal hip, and core control in self care, mobility, lifting, ambulation and eccentric single leg control. [] UE / cervical: cervical, scapular, scapulothoracic and UE control with self care, reaching, carrying, lifting, house/yardwork, driving, computer work.   [] (44416) Therapist is in constant attendance of 2 or more patients providing skilled therapy interventions, but not providing any significant amount of measurable one-on-one time to either patient, for improvements in  [] LE / lumbar: LE, proximal hip, and core control in self care, mobility, lifting, ambulation and eccentric single leg control. [] UE / cervical: cervical, scapular, scapulothoracic and UE control with self care, reaching, carrying, lifting, house/yardwork, driving, computer work. NMR and Therapeutic Activities:    [] (99887 or 78385) Provided verbal/tactile cueing for activities related to improving balance, coordination, kinesthetic sense, posture, motor skill, proprioception and motor activation to allow for proper function of   [] LE: / Lumbar core, proximal hip and LE with self care and ADLs  [] UE / Cervical: cervical, postural, scapular, scapulothoracic and UE control with self care, carrying, lifting, driving, computer work.   [] (68156) Gait Re-education- Provided training and instruction to the patient for proper LE, core and proximal hip recruitment and positioning and eccentric body weight control with ambulation re-education including up and down stairs     Home Management Training / Self Care:  [] (13266) Provided self-care/home management training related to activities of daily living and compensatory training, and/or use of adaptive equipment for improvement with: ADLs and compensatory training, meal preparation, safety procedures and instruction in use of adaptive equipment, including bathing, grooming, dressing, personal hygiene, basic household cleaning and chores.      Home Exercise Program:    [x] (63351) Reviewed/Progressed HEP activities related to strengthening, flexibility, endurance, ROM of   [x] LE / Lumbar: core, proximal hip and LE for functional self-care, mobility, lifting and ambulation/stair navigation   [] UE / Cervical: cervical, postural, scapular, scapulothoracic and UE control with self care, reaching, carrying, lifting, house/yardwork, driving, computer work  [] (77401)Reviewed/Progressed HEP activities related to improving balance, coordination, kinesthetic sense, posture, motor skill, proprioception of   [] LE: core, proximal hip and LE for self care, mobility, lifting, and ambulation/stair navigation    [] UE / Cervical: cervical, postural,  scapular, scapulothoracic and UE control with self care, reaching, carrying, lifting, house/yardwork, driving, computer work    Manual Treatments:  PROM / STM / Oscillations-Mobs:  G-I, II, III, IV (PA's, Inf., Post.)  [] (23689) Provided manual therapy to mobilize LE, proximal hip and/or LS spine soft tissue/joints for the purpose of modulating pain, promoting relaxation,  increasing ROM, reducing/eliminating soft tissue swelling/inflammation/restriction, improving soft tissue extensibility and allowing for proper ROM for normal function with   [] LE / lumbar: self care, mobility, lifting and ambulation. [] UE / Cervical: self care, reaching, carrying, lifting, house/yardwork, driving, computer work. Modalities:  [] (22126) Vasopneumatic compression: Utilized vasopneumatic compression to decrease edema / swelling for the purpose of improving mobility and quad tone / recruitment which will allow for increased overall function including but not limited to self-care, transfers, ambulation, and ascending / descending stairs.        Charges:  Timed Code Treatment Minutes: 30   Total Treatment Minutes: 60     [x] EVAL - LOW (91957)   [] EVAL - MOD (46502)  [] EVAL - HIGH (92618)  [] RE-EVAL (65544)  [x] UQ(32594) x  1     [] Ionto  [] NMR (12609) x       [] Vaso  [] Manual (62846) x       [] Ultrasound  [x] TA x 1       [] Mech Traction (75801)  [] Aquatic Therapy x [] ES (un) (78023):   [] Home Management Training x  [] ES(attended) (05305)   [] Dry Needling 1-2 muscles (33630):  [] Dry Needling 3+ muscles (801872)  [] Group:      [] Other:     GOALS:   Patient stated goal: \"back to walking\" and \"back to normal life\"   [] Progressing: [] Met: [] Not Met: [] Adjusted     Therapist goals for Patient:   Short Term Goals: To be achieved in: 2 weeks  1. Independent in HEP and progression per patient tolerance, in order to prevent re-injury. [] Progressing: [] Met: [] Not Met: [] Adjusted  2. Patient will have a decrease in pain to facilitate improvement in movement, function, and ADLs as indicated by Functional Deficits. [] Progressing: [] Met: [] Not Met: [] Adjusted     Long Term Goals: To be achieved in: 8 weeks  1. FOTO score of at least NPV to assist with reaching prior level of function. [] Progressing: [] Met: [] Not Met: [] Adjusted  2. Patient will demonstrate increased L ankle DF AROM to 8 deg or greater to allow for increased heel strike during gait and less difficulty with descending stairs. [] Progressing: [] Met: [] Not Met: [] Adjusted  3. Patient will demonstrate an increase in L gross ankle strength to at least 5/5 for improved ankle stability  with functional tasks such as walking and stairs. [] Progressing: [] Met: [] Not Met: [] Adjusted  4. Patient will return to functional activities including climbing a full flight of stairs with 1-2 HR and reciprocal pattern to promote independence and safety at home. [] Progressing: [] Met: [] Not Met: [] Adjusted  5. Patient to demonstrate normal gait including heel strike and toe off on L with equal step length without AD and ability to complete 6 min walk test to improve endurance and return to PLOF. [] Progressing: [] Met: [] Not Met: [] Adjusted         Overall Progression Towards Functional goals/ Treatment Progress Update:  [] Patient is progressing as expected towards functional goals listed.     [] Progression is slowed due to complexities/Impairments listed. [] Progression has been slowed due to co-morbidities. [x] Plan just implemented, too soon to assess goals progression <30days   [] Goals require adjustment due to lack of progress  [] Patient is not progressing as expected and requires additional follow up with physician  [] Other    Persisting Functional Limitations/Impairments:  []Sleeping []Sitting               [x]Standing []Transfers        [x]Walking []Kneeling               [x]Stairs []Squatting / bending   []ADLs []Reaching  [x]Lifting  []Housework  []Driving []Job related tasks  []Sports/Recreation []Other:        ASSESSMENT:  Pt presenting to PT s/p a fall in which she sustained a 5th metatarsal fracture. She was initially NWB but the fracture was sustained in July 2022 and is now healed and pt has been progressed to [facility]. After being NWB for an extended period of time, she demonstrates decreased ROM throughout her R ankle as well as weakness. She demonstrates an impaired gait and difficulty with stairs. She has been relying on an AD for ambulation since the injury but did not require one prior to the injury. Pt was educated on the use of a compression garment during the day (and to remove at night) and a small below knee tensoshape was provided. Also reassured the pt that what she is experiencing is normal for this type of injury. Pt is a good candidate for skilled PT and would benefit from PT to return to [facility]. Treatment/Activity Tolerance:  [x] Patient able to complete tx [] Patient limited by fatigue  [] Patient limited by pain  [] Patient limited by other medical complications  [] Other:     Prognosis: [x] Good [] Fair  [] Poor    Patient Requires Follow-up: [x] Yes  [] No    Plan for next treatment session:    PLAN: See eval. PT 1-2x / week for 8 weeks.    [] Continue per plan of care [] Alter current plan (see comments)  [x] Plan of care initiated [] Hold pending MD visit [] Discharge    Electronically signed by: Jaren Norman PT DPT    Note: If patient does not return for scheduled/ recommended follow up visits, this note will serve as a discharge from care along with most recent update on progress.

## 2022-11-11 NOTE — PLAN OF CARE
[address] [address], [address]  Phone: [phone]   Fax: [phone]                                                       Physical Therapy Certification    Dear Shahzad Francis MD  ,    We had the pleasure of evaluating the following patient for physical therapy services at [address] [place] [address]. A summary of our findings can be found in the initial assessment below. This includes our plan of care. If you have any questions or concerns regarding these findings, please do not hesitate to contact me at the office phone number checked above.   Thank you for the referral.       Physician Signature:_______________________________Date:__________________  By signing above (or electronic signature), therapists plan is approved by physician      Patient: Betty Maxwell   : 1970   MRN: [id number]  Referring Physician: Shahzad Francis MD        Evaluation Date: 2022      Medical Diagnosis Information:  Left foot pain [M79.672]  Displaced fracture of fifth metatarsal bone, left foot, initial encounter for closed fracture [S92.352A]   PT diagnosis: L foot pain, decreased L ankle ROM and strength, impaired gait and ability to climb stairs, decreased standing and walking tolerance, L ankle swelling                                       Insurance information: PT Insurance Information: BCBS: all codes billable, $30 copay, no coinsurance, 50 visit limit combined per lisa year, hard max, none used, no auth     Precautions/ Contra-indications: recently WBAT (as of 22)  Latex Allergy:  [x]NO      []YES  Preferred Language for Healthcare:   [x]English       []Other:    C-SSRS Triggered by Intake questionnaire (Past 2 wk assessment ):   [x] No, Questionnaire did not trigger screening.   [] Yes, Patient intake triggered C-SSRS Screening     [] Completed, no further action required. [] Completed, PCP notified via Epic    SUBJECTIVE: Patient stated complaint:Pt was in [place] visiting family and fell and rolled her foot on 7/11/22. She fractured her 5th metatarsal and tore a ligament. She went to the ED in [place] and was splinted and told to use crutches. She used ice and pain killers for the first 4-5 days but then stopped. Pt had difficulty with the crutches so primarily used a walker. She was in the boot for about two months, then ordered a \"special shoe\" from [employer] that she was wearing. Pt reports she was afraid for weight bear on it and hurt it so she used a knee scooter to get around. Pt saw her PCP in October 2022 who then referred her to Dr. Cheryl Larkin. She saw Dr. Cheryl Larkin on 11/8/22 and he told her she could be WBAT and wear normal shoes. Pt reports it is hard to wear normal shoes currently because of the swelling in her foot. Pt states sleeping is fine. She only has pain when in weight bearing. Standing tolerance is only 10-20 minutes. She can stand long enough to do some cooking, but cannot cook and wash dishes without sitting to take a break. 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 Patient's father is visiting and pt lives with  and son. Has a two story house with a split stair case, so if she wants to go to another room on the second floor, she has to go down stairs and then back up the other part of the staircase.        OBJECTIVE: Palpation: Pain at L metatarsal heads with 2-5 toe flexion    Functional Mobility/Transfers: Independent    Inspection: Skin darker in color on L foot     Bandages/Dressings/Incisions: n/a    Gait:  without AD: decreased stance time on L LE; decreased L ankle DF/PF and knee flexion    Dermatomes Normal Abnormal Comments   inguinal area (L1)       anterior mid-thigh (L2)      distal ant thigh/med knee (L3) x     medial lower leg and foot (L4) x     lateral lower leg and foot (L5) x     posterior calf (S1) x     medial calcaneus (S2) x         Reflexes - Not tested  Normal Abnormal Comments   S1-2 Seated achilles      S1-2 Prone knee bend      L3-4 Patellar tendon      Clonus      Babinski         AROM    L R   Hip Flexion     Hip Abduction     Hip ER     Hip IR     Knee Flexion 133 130   Knee Extension -3 -3   Dorsiflexion  2 9   Plantarflexion  16 deg of motion-- starting position lacking 12 degrees 62   Inversion  38 54   Eversion  24 with compensation into DF 32       Strength (0-5) / Myotomes Left  (Ankle strength in available range) Right   Hip Flexion - supine     Hip Flexion - seated (L1-2)     Hip Abduction     Hip Adduction     Hip ER     Hip IR     Quads (L2-4) 5 5   Hamstrings 5 5   Ankle Dorsiflexion (L4-5) 4 5   Ankle Plantarflexion (S1-2) 2+ 5   Ankle Inversion 4 - 5   Ankle Eversion (S1-2) 4 - 5   Great Toe Extension (L5)          Girth (cm)     Mid patella     Suprapatellar     Figure 8 51 cm 50.1 cm   Transmalleolar     Metatarsal Heads     Calf 34.5 cm        Joint mobility: L TCJ not assesed   [x]Normal - R ankle and foot ; at L metatarsals   []Hypo   []Hyper    Stairs: 4 inch stairs: B HR used (relies heavily on HR); Up with reciprocal pattern, Down - non reciprocally laterally leading with L LE   - pt able to come down the 4 inch stairs with hips in neutral and toes facing forward nonreciprocally when cued, leading with L LE                        [x] Patient history, allergies, meds reviewed. Medical chart reviewed. See intake form. Review Of Systems (ROS):  [x]Performed Review of systems (Integumentary, CardioPulmonary, Neurological) by intake and observation. Intake form has been scanned into medical record. Patient has been instructed to contact their primary care physician regarding ROS issues if not already being addressed at this time.       Co-morbidities/Complexities (which will affect course of rehabilitation):   [x]None        []Hx of COVID   Arthritic conditions   []Rheumatoid arthritis (M05.9)  []Osteoarthritis (M19.91)  []Gout   Cardiovascular conditions   []Hypertension (I10)  []Hyperlipidemia (E78.5)  []Angina pectoris (I20)  []Atherosclerosis (I70)  []Pacemaker  []Hx of CABG/stent/  cardiac surgeries   Musculoskeletal conditions   []Disc pathology   []Congenital spine pathologies   []Osteoporosis (M81.8)  []Osteopenia (M85.8)  []Scoliosis       Endocrine conditions   []Hypothyroid (E03.9)  []Hyperthyroid Gastrointestinal conditions   []Constipation (O38.53)   Metabolic conditions   []Morbid obesity (E66.01)  []Diabetes type 1(E10.65) or 2 (E11.65)   []Neuropathy (G60.9)     Cardio/Pulmonary conditions   []Asthma (J45)  []Coughing   []COPD (J44.9)  []CHF  []A-fib   Psychological Disorders  []Anxiety (F41.9)  []Depression (F32.9)   []Other:   Developmental Disorders  []Autism (F84.0)  []CP (G80)  []Down Syndrome (Q90.9)  []Developmental delay     Neurological conditions  []Prior Stroke (I69.30)  []Parkinson's (G20)  []Encephalopathy (G93.40)  []MS (G35)  []Post-polio (G14)  []SCI  []TBI  []ALS Other conditions  []Fibromyalgia (M79.7)  []Vertigo  []Syncope  []Kidney Failure  []Cancer      []currently undergoing                treatment  []Pregnancy  []Incontinence   Prior surgeries  []involved limb  []previous spinal surgery  [] section birth  []hysterectomy  []bowel / bladder surgery  []other relevant surgeries   []Other:              Barriers to/and or personal factors that will affect rehab potential:              []Age  [x]Sex    []Smoker              [x]Motivation/Lack of Motivation                        [x]Co-Morbidities - lack of               []Cognitive Function, education/learning barriers              []Environmental, home barriers              []profession/work barriers  []past PT/medical experience  []other:      Falls Risk Assessment (30 days):   [x] Falls Risk assessed and no intervention required. [] Falls Risk assessed and Patient requires intervention due to being higher risk   TUG score (>12s at risk):     [] Falls education provided, including        ASSESSMENT: Pt presenting to PT s/p a fall in which she sustained a 5th metatarsal fracture. She was initially NWB but the fracture was sustained in July 2022 and is now healed and pt has been progressed to [facility]. After being NWB for an extended period of time, she demonstrates decreased ROM throughout her R ankle as well as weakness. She demonstrates an impaired gait and difficulty with stairs. She has been relying on an AD for ambulation since the injury but did not require one prior to the injury. Pt was educated on the use of a compression garment during the day (and to remove at night) and a small below knee tensoshape was provided. Also reassured the pt that what she is experiencing is normal for this type of injury. Pt is a good candidate for skilled PT and would benefit from PT to return to [facility].    Functional Impairments:     []Noted lumbar/proximal hip/LE joint hypomobility   [x]Decreased LE functional ROM   []Decreased core/proximal hip strength and neuromuscular control   [x]Decreased LE functional strength   []Reduced balance/proprioceptive control   []other:      Functional Activity Limitations (from functional questionnaire and intake)   []Reduced ability to tolerate prolonged functional positions   []Reduced ability or difficulty with changes of positions or transfers between positions   [x]Reduced ability to maintain good posture and demonstrate good body mechanics with sitting, bending, and lifting   []Reduced ability to sleep   [] Reduced ability or tolerance with driving and/or computer work   [x]Reduced ability to perform lifting, carrying tasks   []Reduced ability to squat   []Reduced ability to forward bend   [x]Reduced ability to ambulate prolonged functional periods/distances/surfaces   [x]Reduced ability to ascend/descend stairs   []Reduced ability to run, hop, cut or jump   []other:    Participation Restrictions   []Reduced participation in self care activities   [x]Reduced participation in home management activities   []Reduced participation in work activities   [x]Reduced participation in social activities. []Reduced participation in sport/recreation activities. Classification :    [x]Signs/symptoms consistent with post-injury status including decreased ROM, strength and function.    []Signs/symptoms consistent with joint sprain/strain   []Signs/symptoms consistent with patella-femoral syndrome   []Signs/symptoms consistent with knee OA/hip OA   []Signs/symptoms consistent with internal derangement of knee/Hip   []Signs/symptoms consistent with functional hip weakness/NMR control      []Signs/symptoms consistent with tendinitis/tendinosis    []signs/symptoms consistent with pathology which may benefit from Dry needling      []other:      Prognosis/Rehab Potential:      []Excellent   [x]Good    []Fair   []Poor    Tolerance of evaluation/treatment:    []Excellent   [x]Good    []Fair   []Poor    Physical Therapy Evaluation Complexity Justification  [x] A history of present problem with:  [x] no personal factors and/or comorbidities that impact the plan of care;  []1-2 personal factors and/or comorbidities that impact the plan of care  []3 personal factors and/or comorbidities that impact the plan of care  [x] An examination of body systems using standardized tests and measures addressing any of the following: body structures and functions (impairments), activity limitations, and/or participation restrictions;:  [] a total of 1-2 or more elements   [] a total of 3 or more elements   [x] a total of 4 or more elements   [x] A clinical presentation with:  [x] stable and/or uncomplicated characteristics   [] evolving clinical presentation with changing characteristics  [] unstable and unpredictable characteristics;   [x] Clinical decision making of [x] Low, [] moderate, [] high complexity using standardized patient assessment instrument and/or measurable assessment of functional outcome. [x] EVAL (LOW) 36555 (typically 15 minutes face-to-face)  [] EVAL (MOD) 72719 (typically 30 minutes face-to-face)  [] EVAL (HIGH) 56756 (typically 45 minutes face-to-face)  [] RE-EVAL     PLAN:   Frequency/Duration:  1-2 days per week for 8 Weeks:  Interventions:  [x]  Therapeutic exercise including: strength training, ROM, for Lower extremity and core   [x]  NMR activation and proprioception for LE, Glutes and Core   [x]  Manual therapy as indicated for LE, Hip and spine to include: Dry Needling/IASTM, STM, PROM, Gr I-IV mobilizations, manipulation. [x] Modalities as needed that may include: thermal agents, E-stim, Biofeedback, US, iontophoresis as indicated  [x] Patient education on joint protection, postural re-education, activity modification, progression of HEP. HEP instruction: Written HEP instructions provided and reviewed. Access Code: [email or web address]  URL: [email or web address]  Date: 11/11/2022  Prepared by: Margaret Bach    Exercises  Supine Heel Slide - 2-3 x daily - 7 x weekly - 1 sets - 10 reps  Supine Quad Set - 2-3 x daily - 7 x weekly - 3 sets - 10 reps  Seated Heel Raise - 2-3 x daily - 7 x weekly - 3 sets - 10 reps  Seated Toe Raise - 2-3 x daily - 7 x weekly - 3 sets - 10 reps  Seated Ankle Circles - 2-3 x daily - 7 x weekly - 3 sets - 10 reps  Towel Scrunches - 2-3 x daily - 7 x weekly - 3 sets - 10 reps  Ankle Inversion Eversion Towel Slide - 2-3 x daily - 7 x weekly - 3 sets - 10 reps    GOALS:  Patient stated goal: \"back to walking\" and \"back to normal life\"   [] Progressing: [] Met: [] Not Met: [] Adjusted    Therapist goals for Patient:   Short Term Goals: To be achieved in: 2 weeks  1. Independent in HEP and progression per patient tolerance, in order to prevent re-injury. [] Progressing: [] Met: [] Not Met: [] Adjusted  2. Patient will have a decrease in pain to facilitate improvement in movement, function, and ADLs as indicated by Functional Deficits. [] Progressing: [] Met: [] Not Met: [] Adjusted    Long Term Goals: To be achieved in: 8 weeks  1. FOTO score of at least NPV to assist with reaching prior level of function. [] Progressing: [] Met: [] Not Met: [] Adjusted  2. Patient will demonstrate increased L ankle DF AROM to 8 deg or greater to allow for increased heel strike during gait and less difficulty with descending stairs. [] Progressing: [] Met: [] Not Met: [] Adjusted  3. Patient will demonstrate an increase in L gross ankle strength to at least 5/5 for improved ankle stability  with functional tasks such as walking and stairs. [] Progressing: [] Met: [] Not Met: [] Adjusted  4. Patient will return to functional activities including climbing a full flight of stairs with 1-2 HR and reciprocal pattern to promote independence and safety at home. [] Progressing: [] Met: [] Not Met: [] Adjusted  5. Patient to demonstrate normal gait including heel strike and toe off on L with equal step length without AD and ability to complete 6 min walk test to improve endurance and return to PLOF.    [] Progressing: [] Met: [] Not Met: [] Adjusted     Electronically signed by:  Benito Avendano PT DPT

## 2022-11-18 ENCOUNTER — HOSPITAL ENCOUNTER (OUTPATIENT)
Dept: PHYSICAL THERAPY | Age: 52
Setting detail: THERAPIES SERIES
Discharge: HOME | End: 2022-11-18
Payer: COMMERCIAL

## 2022-11-18 PROCEDURE — 97112 NEUROMUSCULAR REEDUCATION: CPT

## 2022-11-18 PROCEDURE — 97110 THERAPEUTIC EXERCISES: CPT

## 2022-11-18 PROCEDURE — 97140 MANUAL THERAPY 1/> REGIONS: CPT

## 2022-11-18 NOTE — FLOWSHEET NOTE
[facility]  Phone: [phone]   Fax: [phone]    Physical Therapy Daily Treatment Note    Date:  2022     Patient Name:  Mary Maynard    :  1970  MRN: [id number]  Medical Diagnosis:  Left foot pain [M79.672]  Displaced fracture of fifth metatarsal bone, left foot, initial encounter for closed fracture [S92.352A]  Treatment Diagnosis: L foot pain, decreased L ankle ROM and strength, impaired gait and ability to climb stairs, decreased standing and walking tolerance, L ankle swelling  Insurance/Certification information:  PT Insurance Information: BCBS: all codes billable, $30 copay, no coinsurance, 50 visit limit combined per lisa year, hard max, none used, no auth  Physician Information:  Tucker Cobb MD    Plan of care signed (Y/N): []  Yes [x]  No     Date of Patient follow up with Physician:      Progress Report: []  Yes  [x]  No     Date Range for reporting period:  Beginnin2022  Ending:     Progress report due (10 Rx/or 30 days whichever is less): visit #10 or       Recertification due (POC duration/ or 90 days whichever is less): visit #18 or 23     Visit # Insurance Allowable Auth required? Date Range    50 combined  Hard max []  Yes  [x]  No N/a       Latex Allergy:  [x]NO      []YES  Preferred Language for Healthcare:   [x]English       []other:    Functional Scale:           Date assessed:  FOTO physical FS primary measure score = NPV; risk adjusted = NPV  NPV    Pain level:  5/10     SUBJECTIVE:  Pt states foot and ankle still painful .      OBJECTIVE: See eval      RESTRICTIONS/PRECAUTIONS: 5th met fracture = 22    Exercises/Interventions:     Therapeutic Exercises (29569) Resistance / level Sets/sec Reps Notes   Bike  5'     IB - gastroc  IB- soleus   2 30''    HR - neutral       4 way ankle  [place]  1 20                                       Therapeutic Activities (95141) Gait (04581)       Normal gait pattern              Stairs - step ups fwd       Stairs - step ups lat       Stairs - fwd step downs              Neuromuscular Re-ed (12883)       Single leg balance       Small fitter board a/p, R/L, CW  X1 EA                                 Manual Intervention (43531)       PROM L ankle , STM, TC mobs 13'                                             Modalities: 11/11: good candidate for vaso    Pt. Education:  11/11/2022  -patient educated on diagnosis, prognosis and expectations for rehab  -all patient questions were answered  -educated on compression (wear, wash, what to look out for when wearing), ice, coming down stairs in neutral instead of sideways     Home Exercise Program:  Access Code: [email or web address]  URL: [email or web address]  Date: 11/11/2022  Prepared by: Kari Li     Exercises  Supine Heel Slide - 2-3 x daily - 7 x weekly - 1 sets - 10 reps  Supine Quad Set - 2-3 x daily - 7 x weekly - 3 sets - 10 reps  Seated Heel Raise - 2-3 x daily - 7 x weekly - 3 sets - 10 reps  Seated Toe Raise - 2-3 x daily - 7 x weekly - 3 sets - 10 reps  Seated Ankle Circles - 2-3 x daily - 7 x weekly - 3 sets - 10 reps  Towel Scrunches - 2-3 x daily - 7 x weekly - 3 sets - 10 reps  Ankle Inversion Eversion Towel Slide - 2-3 x daily - 7 x weekly - 3 sets - 10     Access Code: [email or web address]  URL: [email or web address]  Date: 11/18/2022  Prepared by: Alireza Gimenez    Exercises  Long Sitting Ankle Eversion with Resistance - 1 x daily - 7 x weekly - 3 sets - 10 reps  Long Sitting Ankle Inversion with Resistance - 1 x daily - 7 x weekly - 3 sets - 10 reps  Long Sitting Ankle Dorsiflexion with Anchored Resistance - 1 x daily - 7 x weekly - 3 sets - 10 reps  Long Sitting Ankle Plantar Flexion with Resistance - 1 x daily - 7 x weekly - 3 sets - 10 reps        Therapeutic Exercise and NMR EXR  [x] (62688) Provided verbal/tactile cueing for activities related to strengthening, flexibility, endurance, ROM for improvements in  [x] LE / Lumbar: LE, proximal hip, and core control with self care, mobility, lifting, ambulation. [] UE / Cervical: cervical, postural, scapular, scapulothoracic and UE control with self care, reaching, carrying, lifting, house/yardwork, driving, computer work.  [] (29207) Provided verbal/tactile cueing for activities related to improving balance, coordination, kinesthetic sense, posture, motor skill, proprioception to assist with   [] LE / lumbar: LE, proximal hip, and core control in self care, mobility, lifting, ambulation and eccentric single leg control. [] UE / cervical: cervical, scapular, scapulothoracic and UE control with self care, reaching, carrying, lifting, house/yardwork, driving, computer work.   [] (67494) Therapist is in constant attendance of 2 or more patients providing skilled therapy interventions, but not providing any significant amount of measurable one-on-one time to either patient, for improvements in  [] LE / lumbar: LE, proximal hip, and core control in self care, mobility, lifting, ambulation and eccentric single leg control. [] UE / cervical: cervical, scapular, scapulothoracic and UE control with self care, reaching, carrying, lifting, house/yardwork, driving, computer work.      NMR and Therapeutic Activities:    [] (69842 or 30958) Provided verbal/tactile cueing for activities related to improving balance, coordination, kinesthetic sense, posture, motor skill, proprioception and motor activation to allow for proper function of   [] LE: / Lumbar core, proximal hip and LE with self care and ADLs  [] UE / Cervical: cervical, postural, scapular, scapulothoracic and UE control with self care, carrying, lifting, driving, computer work.   [] (08838) Gait Re-education- Provided training and instruction to the patient for proper LE, core and proximal hip recruitment and positioning and eccentric body weight control with ambulation re-education including up and down stairs     Home Management Training / Self Care:  [] (33357) Provided self-care/home management training related to activities of daily living and compensatory training, and/or use of adaptive equipment for improvement with: ADLs and compensatory training, meal preparation, safety procedures and instruction in use of adaptive equipment, including bathing, grooming, dressing, personal hygiene, basic household cleaning and chores. Home Exercise Program:    [x] (86166) Reviewed/Progressed HEP activities related to strengthening, flexibility, endurance, ROM of   [x] LE / Lumbar: core, proximal hip and LE for functional self-care, mobility, lifting and ambulation/stair navigation   [] UE / Cervical: cervical, postural, scapular, scapulothoracic and UE control with self care, reaching, carrying, lifting, house/yardwork, driving, computer work  [] (42405)Reviewed/Progressed HEP activities related to improving balance, coordination, kinesthetic sense, posture, motor skill, proprioception of   [] LE: core, proximal hip and LE for self care, mobility, lifting, and ambulation/stair navigation    [] UE / Cervical: cervical, postural,  scapular, scapulothoracic and UE control with self care, reaching, carrying, lifting, house/yardwork, driving, computer work    Manual Treatments:  PROM / STM / Oscillations-Mobs:  G-I, II, III, IV (PA's, Inf., Post.)  [] (89710) Provided manual therapy to mobilize LE, proximal hip and/or LS spine soft tissue/joints for the purpose of modulating pain, promoting relaxation,  increasing ROM, reducing/eliminating soft tissue swelling/inflammation/restriction, improving soft tissue extensibility and allowing for proper ROM for normal function with   [] LE / lumbar: self care, mobility, lifting and ambulation. [] UE / Cervical: self care, reaching, carrying, lifting, house/yardwork, driving, computer work.      Modalities:  [] (28662) Vasopneumatic compression: Utilized vasopneumatic compression to decrease edema / swelling for the purpose of improving mobility and quad tone / recruitment which will allow for increased overall function including but not limited to self-care, transfers, ambulation, and ascending / descending stairs. Charges:  Timed Code Treatment Minutes: 45   Total Treatment Minutes: 45     [] EVAL - LOW (62815)   [] EVAL - MOD (73623)  [] EVAL - HIGH (11835)  [] RE-EVAL (54493)  [x] IQ(63011) x  1     [] Ionto  [x] NMR (45919) x 1      [] Vaso  [x] Manual (24503) x  1     [] Ultrasound  [] TA x 1       [] Mech Traction (88620)  [] Aquatic Therapy x     [] ES (un) (49020):   [] Home Management Training x  [] ES(attended) (57298)   [] Dry Needling 1-2 muscles (16295):  [] Dry Needling 3+ muscles (954628)  [] Group:      [] Other:     GOALS:   Patient stated goal: \"back to walking\" and \"back to normal life\"   [] Progressing: [] Met: [] Not Met: [] Adjusted     Therapist goals for Patient:   Short Term Goals: To be achieved in: 2 weeks  1. Independent in HEP and progression per patient tolerance, in order to prevent re-injury. [] Progressing: [] Met: [] Not Met: [] Adjusted  2. Patient will have a decrease in pain to facilitate improvement in movement, function, and ADLs as indicated by Functional Deficits. [] Progressing: [] Met: [] Not Met: [] Adjusted     Long Term Goals: To be achieved in: 8 weeks  1. FOTO score of at least NPV to assist with reaching prior level of function. [] Progressing: [] Met: [] Not Met: [] Adjusted  2. Patient will demonstrate increased L ankle DF AROM to 8 deg or greater to allow for increased heel strike during gait and less difficulty with descending stairs. [] Progressing: [] Met: [] Not Met: [] Adjusted  3. Patient will demonstrate an increase in L gross ankle strength to at least 5/5 for improved ankle stability  with functional tasks such as walking and stairs.    [] Progressing: [] Met: [] Not Met: [] Adjusted  4. Patient will return to functional activities including climbing a full flight of stairs with 1-2 HR and reciprocal pattern to promote independence and safety at home. [] Progressing: [] Met: [] Not Met: [] Adjusted  5. Patient to demonstrate normal gait including heel strike and toe off on L with equal step length without AD and ability to complete 6 min walk test to improve endurance and return to PLOF. [] Progressing: [] Met: [] Not Met: [] Adjusted         Overall Progression Towards Functional goals/ Treatment Progress Update:  [] Patient is progressing as expected towards functional goals listed. [] Progression is slowed due to complexities/Impairments listed. [] Progression has been slowed due to co-morbidities. [x] Plan just implemented, too soon to assess goals progression <30days   [] Goals require adjustment due to lack of progress  [] Patient is not progressing as expected and requires additional follow up with physician  [] Other    Persisting Functional Limitations/Impairments:  []Sleeping []Sitting               [x]Standing []Transfers        [x]Walking []Kneeling               [x]Stairs []Squatting / bending   []ADLs []Reaching  [x]Lifting  []Housework  []Driving []Job related tasks  []Sports/Recreation []Other:        ASSESSMENT:  Pt with overall good tolerance to visit, added 4 way ankle to HEP. Pt continues to ambulate with antalgic slow gait. Look to add vaso at next visit. Treatment/Activity Tolerance:  [x] Patient able to complete tx [] Patient limited by fatigue  [] Patient limited by pain  [] Patient limited by other medical complications  [] Other:     Prognosis: [x] Good [] Fair  [] Poor    Patient Requires Follow-up: [x] Yes  [] No    Plan for next treatment session:    PLAN: See eval. PT 1-2x / week for 8 weeks.    [x] Continue per plan of care [] Alter current plan (see comments)  [] Plan of care initiated [] Hold pending MD visit [] Discharge    Electronically signed by: India Garcia PT DPT    Note: If patient does not return for scheduled/ recommended follow up visits, this note will serve as a discharge from care along with most recent update on progress.

## 2022-11-22 ENCOUNTER — HOSPITAL ENCOUNTER (OUTPATIENT)
Dept: PHYSICAL THERAPY | Age: 52
Setting detail: THERAPIES SERIES
Discharge: HOME | End: 2022-11-22
Payer: COMMERCIAL

## 2022-11-22 NOTE — FLOWSHEET NOTE
[address]     Physical Therapy  Cancellation/No-show Note  Patient Name:  Buddy Omer  :  1970   Date:  2022  Cancelled visits to date: 1  No-shows to date: 0    Patient status for today's appointment patient:  [x]  Cancelled   []  Rescheduled appointment  []  No-show     Reason given by patient:  [x]  Patient ill - not feeling well   []  Conflicting appointment  []  No transportation    []  Conflict with work  []  No reason given  []  Other:     Comments:      Phone call information:   []  Phone call made today to patient at _ time at number provided:      []  Patient answered, conversation as follows:    []  Patient did not answer, message left as follows:  []  Phone call not made today  [x]  Phone call not needed - pt contacted us to cancel and provided reason for cancellation.      Electronically signed by:  Patrick Hudson PT DPT

## 2022-11-29 ENCOUNTER — APPOINTMENT (OUTPATIENT)
Dept: PHYSICAL THERAPY | Age: 52
End: 2022-11-29
Payer: COMMERCIAL

## 2022-11-29 ENCOUNTER — HOSPITAL ENCOUNTER (OUTPATIENT)
Dept: PHYSICAL THERAPY | Age: 52
Setting detail: THERAPIES SERIES
Discharge: HOME | End: 2022-11-29
Payer: COMMERCIAL

## 2022-11-29 PROCEDURE — 97110 THERAPEUTIC EXERCISES: CPT

## 2022-11-29 PROCEDURE — 97116 GAIT TRAINING THERAPY: CPT

## 2022-11-29 PROCEDURE — 97016 VASOPNEUMATIC DEVICE THERAPY: CPT

## 2022-11-29 PROCEDURE — 97140 MANUAL THERAPY 1/> REGIONS: CPT

## 2022-11-29 NOTE — FLOWSHEET NOTE
[facility]  Phone: [phone]   Fax: [phone]    Physical Therapy Daily Treatment Note    Date:  2022     Patient Name:  Teresa Nicole    :  1970  MRN: [id number]  Medical Diagnosis:  Left foot pain [M79.672]  Displaced fracture of fifth metatarsal bone, left foot, initial encounter for closed fracture [S92.352A]  Treatment Diagnosis: L foot pain, decreased L ankle ROM and strength, impaired gait and ability to climb stairs, decreased standing and walking tolerance, L ankle swelling  Insurance/Certification information:  PT Insurance Information: BCBS: all codes billable, $30 copay, no coinsurance, 50 visit limit combined per lisa year, hard max, none used, no auth  Physician Information:  Celso Salter MD    Plan of care signed (Y/N): [x]  Yes []  No     Date of Patient follow up with Physician:      Progress Report: []  Yes  [x]  No     Date Range for reporting period:  Beginnin2022 - signed  Ending:     Progress report due (10 Rx/or 30 days whichever is less): visit #10 or       Recertification due (POC duration/ or 90 days whichever is less): visit #18 or 23     Visit # Insurance Allowable Auth required? Date Range   3/16 50 combined  Hard max []  Yes  [x]  No N/a       Latex Allergy:  [x]NO      []YES  Preferred Language for Healthcare:   [x]English       []other:    Functional Scale:           Date assessed:  FOTO physical FS primary measure score = NPV; risk adjusted = NPV  NPV    Pain level:  3/10     SUBJECTIVE:  Patient reports that she was sick last week and could not do her HEP. She has been wearing the tensoshape issued at [facility] and reports that she feels her swelling is down because her shoes don't feel as tight.      OBJECTIVE: : Pt ambulating with decreased L ankle DF/PF      RESTRICTIONS/PRECAUTIONS: 5th met fracture = 22    Exercises/Interventions:     Therapeutic Exercises (21340) Resistance / level Sets/sec Reps Notes   Bike  SciFit Stepper   L 1 6'     IB - gastroc  IB- soleus   2 27''  30\" B    HR - neutral  2 10    HSS  2 30\" B    4 way ankle  Parker                                        Therapeutic Activities (83112)                                   Gait (00201)       Normal gait pattern              Stairs - step ups fwd 6\" 1 10 B    Stairs - step ups lat 6\" 1 10 B    Stairs - fwd step downs 4\" 1 10 B    Ekaterina- 1 foot step over with emphasis on heel strike/toe off 1 ekaterina 1 10 ea                          Neuromuscular Re-ed (26048)       Single leg balance       Small fitter board a/p, R/L, CW                                  Manual Intervention (46687)       PROM L ankle , STM, TC mobs  10 min                                             Modalities: 11/11: good candidate for vaso    11/29: vaso L ankle  10 min , low pressure, moderate temp in reclined sitting     Girth (cm) L - pre vaso  L - post-vaso   Figure 8 51.5 cm 48.5 cm       Pt. Education:  11/11/2022  -patient educated on diagnosis, prognosis and expectations for rehab  -all patient questions were answered  -educated on compression (wear, wash, what to look out for when wearing), ice, coming down stairs in neutral instead of sideways     Home Exercise Program:  Access Code: [email or web address]  URL: [email or web address]  Date: 11/11/2022  Prepared by: Ollie Seats     Exercises  Supine Heel Slide - 2-3 x daily - 7 x weekly - 1 sets - 10 reps  Supine Quad Set - 2-3 x daily - 7 x weekly - 3 sets - 10 reps  Seated Heel Raise - 2-3 x daily - 7 x weekly - 3 sets - 10 reps  Seated Toe Raise - 2-3 x daily - 7 x weekly - 3 sets - 10 reps  Seated Ankle Circles - 2-3 x daily - 7 x weekly - 3 sets - 10 reps  Towel Scrunches - 2-3 x daily - 7 x weekly - 3 sets - 10 reps  Ankle Inversion Eversion Towel Slide - 2-3 x daily - 7 x weekly - 3 sets - 10     Access Code: [email or web address]  URL: [email or web address]  Date: 11/18/2022  Prepared by: Brenden Ramirez Khan    Exercises  Long Sitting Ankle Eversion with Resistance - 1 x daily - 7 x weekly - 3 sets - 10 reps  Long Sitting Ankle Inversion with Resistance - 1 x daily - 7 x weekly - 3 sets - 10 reps  Long Sitting Ankle Dorsiflexion with Anchored Resistance - 1 x daily - 7 x weekly - 3 sets - 10 reps  Long Sitting Ankle Plantar Flexion with Resistance - 1 x daily - 7 x weekly - 3 sets - 10 reps    Therapeutic Exercise and NMR EXR  [x] (81808) Provided verbal/tactile cueing for activities related to strengthening, flexibility, endurance, ROM for improvements in  [x] LE / Lumbar: LE, proximal hip, and core control with self care, mobility, lifting, ambulation. [] UE / Cervical: cervical, postural, scapular, scapulothoracic and UE control with self care, reaching, carrying, lifting, house/yardwork, driving, computer work.  [] (53277) Provided verbal/tactile cueing for activities related to improving balance, coordination, kinesthetic sense, posture, motor skill, proprioception to assist with   [] LE / lumbar: LE, proximal hip, and core control in self care, mobility, lifting, ambulation and eccentric single leg control. [] UE / cervical: cervical, scapular, scapulothoracic and UE control with self care, reaching, carrying, lifting, house/yardwork, driving, computer work.   [] (93334) Therapist is in constant attendance of 2 or more patients providing skilled therapy interventions, but not providing any significant amount of measurable one-on-one time to either patient, for improvements in  [] LE / lumbar: LE, proximal hip, and core control in self care, mobility, lifting, ambulation and eccentric single leg control. [] UE / cervical: cervical, scapular, scapulothoracic and UE control with self care, reaching, carrying, lifting, house/yardwork, driving, computer work.      NMR and Therapeutic Activities:    [] (10506 or ) Provided verbal/tactile cueing for activities related to improving balance, coordination, kinesthetic sense, posture, motor skill, proprioception and motor activation to allow for proper function of   [] LE: / Lumbar core, proximal hip and LE with self care and ADLs  [] UE / Cervical: cervical, postural, scapular, scapulothoracic and UE control with self care, carrying, lifting, driving, computer work.   [] (87260) Gait Re-education- Provided training and instruction to the patient for proper LE, core and proximal hip recruitment and positioning and eccentric body weight control with ambulation re-education including up and down stairs     Home Management Training / Self Care:  [] (43357) Provided self-care/home management training related to activities of daily living and compensatory training, and/or use of adaptive equipment for improvement with: ADLs and compensatory training, meal preparation, safety procedures and instruction in use of adaptive equipment, including bathing, grooming, dressing, personal hygiene, basic household cleaning and chores.      Home Exercise Program:    [x] (49278) Reviewed/Progressed HEP activities related to strengthening, flexibility, endurance, ROM of   [x] LE / Lumbar: core, proximal hip and LE for functional self-care, mobility, lifting and ambulation/stair navigation   [] UE / Cervical: cervical, postural, scapular, scapulothoracic and UE control with self care, reaching, carrying, lifting, house/yardwork, driving, computer work  [] (47467)Reviewed/Progressed HEP activities related to improving balance, coordination, kinesthetic sense, posture, motor skill, proprioception of   [] LE: core, proximal hip and LE for self care, mobility, lifting, and ambulation/stair navigation    [] UE / Cervical: cervical, postural,  scapular, scapulothoracic and UE control with self care, reaching, carrying, lifting, house/yardwork, driving, computer work    Manual Treatments:  PROM / STM / Oscillations-Mobs:  G-I, II, III, IV (PA's, Inf., Post.)  [] (51613) Provided manual therapy to mobilize LE, proximal hip and/or LS spine soft tissue/joints for the purpose of modulating pain, promoting relaxation,  increasing ROM, reducing/eliminating soft tissue swelling/inflammation/restriction, improving soft tissue extensibility and allowing for proper ROM for normal function with   [] LE / lumbar: self care, mobility, lifting and ambulation. [] UE / Cervical: self care, reaching, carrying, lifting, house/yardwork, driving, computer work. Modalities:  [] (08425) Vasopneumatic compression: Utilized vasopneumatic compression to decrease edema / swelling for the purpose of improving mobility and quad tone / recruitment which will allow for increased overall function including but not limited to self-care, transfers, ambulation, and ascending / descending stairs. Charges:  Timed Code Treatment Minutes: 44   Total Treatment Minutes: 54     [] EVAL - LOW (52051)   [] EVAL - MOD (04212)  [] EVAL - HIGH (56708)  [] RE-EVAL (44795)  [x] TV(82626) x  1     [] Ionto  [] NMR (58020) x       [x] Vaso  [x] Manual (95628) x  1     [] Ultrasound  [] TA x 1       [] Mech Traction (05406)  [] Aquatic Therapy x     [] ES (un) (91492):   [] Home Management Training x  [] ES(attended) (71595)   [] Dry Needling 1-2 muscles (83569):  [] Dry Needling 3+ muscles (920896)  [] Group:      [x] Other: Gait x 1    GOALS:   Patient stated goal: \"back to walking\" and \"back to normal life\"   [] Progressing: [] Met: [] Not Met: [] Adjusted     Therapist goals for Patient:   Short Term Goals: To be achieved in: 2 weeks  1. Independent in HEP and progression per patient tolerance, in order to prevent re-injury. [] Progressing: [] Met: [] Not Met: [] Adjusted  2. Patient will have a decrease in pain to facilitate improvement in movement, function, and ADLs as indicated by Functional Deficits. [] Progressing: [] Met: [] Not Met: [] Adjusted     Long Term Goals: To be achieved in: 8 weeks  1.  FOTO score of at least NPV to assist with reaching prior level of function. [] Progressing: [] Met: [] Not Met: [] Adjusted  2. Patient will demonstrate increased L ankle DF AROM to 8 deg or greater to allow for increased heel strike during gait and less difficulty with descending stairs. [] Progressing: [] Met: [] Not Met: [] Adjusted  3. Patient will demonstrate an increase in L gross ankle strength to at least 5/5 for improved ankle stability  with functional tasks such as walking and stairs. [] Progressing: [] Met: [] Not Met: [] Adjusted  4. Patient will return to functional activities including climbing a full flight of stairs with 1-2 HR and reciprocal pattern to promote independence and safety at home. [] Progressing: [] Met: [] Not Met: [] Adjusted  5. Patient to demonstrate normal gait including heel strike and toe off on L with equal step length without AD and ability to complete 6 min walk test to improve endurance and return to PLOF. [] Progressing: [] Met: [] Not Met: [] Adjusted         Overall Progression Towards Functional goals/ Treatment Progress Update:  [] Patient is progressing as expected towards functional goals listed. [] Progression is slowed due to complexities/Impairments listed. [] Progression has been slowed due to co-morbidities. [x] Plan just implemented, too soon to assess goals progression <30days   [] Goals require adjustment due to lack of progress  [] Patient is not progressing as expected and requires additional follow up with physician  [] Other    Persisting Functional Limitations/Impairments:  []Sleeping []Sitting               [x]Standing []Transfers        [x]Walking []Kneeling               [x]Stairs []Squatting / bending   []ADLs []Reaching  [x]Lifting  []Housework  []Driving []Job related tasks  []Sports/Recreation []Other:        ASSESSMENT:  Pt continues with antalgic gait with decreased PF/DF of L ankle.  Added in gait activities with step and ekaterina to increase ankle ROM and stability and to promote heel strike and toe off. Patient with good mobility of foot and ankle during PROM. Vaso added this date to decrease pain and swelling, measurements listed above. Will continue with strength and stability of L ankle to normalize pt gait. Treatment/Activity Tolerance:  [x] Patient able to complete tx [] Patient limited by fatigue  [] Patient limited by pain  [] Patient limited by other medical complications  [] Other:     Prognosis: [x] Good [] Fair  [] Poor    Patient Requires Follow-up: [x] Yes  [] No    Plan for next treatment session:    PLAN: See eval. PT 1-2x / week for 8 weeks. [x] Continue per plan of care [] Alter current plan (see comments)  [] Plan of care initiated [] Hold pending MD visit [] Discharge    Electronically signed by: Madeleine Estrella PT DPT    Note: If patient does not return for scheduled/ recommended follow up visits, this note will serve as a discharge from care along with most recent update on progress.

## 2022-12-02 ENCOUNTER — HOSPITAL ENCOUNTER (OUTPATIENT)
Dept: PHYSICAL THERAPY | Age: 52
Setting detail: THERAPIES SERIES
Discharge: HOME | End: 2022-12-02
Payer: COMMERCIAL

## 2022-12-02 PROCEDURE — 97016 VASOPNEUMATIC DEVICE THERAPY: CPT

## 2022-12-02 PROCEDURE — 97112 NEUROMUSCULAR REEDUCATION: CPT

## 2022-12-02 PROCEDURE — 97140 MANUAL THERAPY 1/> REGIONS: CPT

## 2022-12-02 PROCEDURE — 97110 THERAPEUTIC EXERCISES: CPT

## 2022-12-02 NOTE — FLOWSHEET NOTE
[address] [facility] Physical Therapy  Phone: [phone]   Fax: [phone]    Physical Therapy Daily Treatment Note    Date:  2022     Patient Name:  Terrace Nageotte    :  1970  MRN: [id number]  Medical Diagnosis:  Left foot pain [M79.672]  Displaced fracture of fifth metatarsal bone, left foot, initial encounter for closed fracture [S92.352A]  Treatment Diagnosis: L foot pain, decreased L ankle ROM and strength, impaired gait and ability to climb stairs, decreased standing and walking tolerance, L ankle swelling  Insurance/Certification information:  PT Insurance Information: BCBS: all codes billable, $30 copay, no coinsurance, 50 visit limit combined per lisa year, hard max, none used, no auth  Physician Information:  Meaghan Dixon MD    Plan of care signed (Y/N): [x]  Yes []  No     Date of Patient follow up with Physician:      Progress Report: []  Yes  [x]  No     Date Range for reporting period:  Beginnin2022 - signed  Ending:     Progress report due (10 Rx/or 30 days whichever is less): visit #10 or       Recertification due (POC duration/ or 90 days whichever is less): visit #18 or 23     Visit # Insurance Allowable Auth required? Date Range    50 combined  Hard max []  Yes  [x]  No N/a       Latex Allergy:  [x]NO      []YES  Preferred Language for Healthcare:   [x]English       []other:    Functional Scale:           Date assessed:  FOTO physical FS primary measure score = NPV; risk adjusted = NPV  NPV    Pain level:  3/10     SUBJECTIVE:  Pt reports things are getting slowly better, still limping but trying to walk more especially at [employer] yesterday.      OBJECTIVE: : Pt ambulating with decreased L ankle DF/PF      RESTRICTIONS/PRECAUTIONS: 5th met fracture = 22    Exercises/Interventions:     Therapeutic Exercises (60203) Resistance / level Sets/sec Reps Notes   Bike  SciFit Stepper   L 1 5'      IB - gastroc  IB- soleus 2 27''  30\" B    HR - neutral  2 10    HSS  2 30\" B    4 way ankle  LIME X30 ea  12/2 ^           Soleus press -BLE 30# 2 10    Leg press SL 35# 2 10                         Therapeutic Activities (95133)                                   Gait (42950)       Normal gait pattern              Stairs - step ups fwd 6\"    Stairs - step ups lat 6\"    Stairs - fwd step downs 4\"    Ekaterina- 1 foot step over with emphasis on heel strike/toe off 1 ekaterina                         Neuromuscular Re-ed (97076)       Single leg balance  10''x4     Small fitter board a/p, R/L, CW      Standing BAPS board   X15 ea  All directions                        Manual Intervention (41013)       PROM L ankle , STM, TC mobs  10 min                                             Modalities: 11/11: good candidate for vaso    11/29, 12/2 : vaso L ankle  10 min , low pressure, moderate temp in reclined sitting     Girth (cm) L - pre vaso  L - post-vaso   Figure 8 51.5 cm 48.5 cm       Pt. Education:  11/11/2022  -patient educated on diagnosis, prognosis and expectations for rehab  -all patient questions were answered  -educated on compression (wear, wash, what to look out for when wearing), ice, coming down stairs in neutral instead of sideways     Home Exercise Program:  Access Code: [email or web address]  URL: [email or web address]  Date: 11/11/2022  Prepared by: Mirian Sotomayor     Exercises  Supine Heel Slide - 2-3 x daily - 7 x weekly - 1 sets - 10 reps  Supine Quad Set - 2-3 x daily - 7 x weekly - 3 sets - 10 reps  Seated Heel Raise - 2-3 x daily - 7 x weekly - 3 sets - 10 reps  Seated Toe Raise - 2-3 x daily - 7 x weekly - 3 sets - 10 reps  Seated Ankle Circles - 2-3 x daily - 7 x weekly - 3 sets - 10 reps  Towel Scrunches - 2-3 x daily - 7 x weekly - 3 sets - 10 reps  Ankle Inversion Eversion Towel Slide - 2-3 x daily - 7 x weekly - 3 sets - 10     Access Code: [email or web address]  URL: [email or web address]  Date: 11/18/2022  Prepared by: Jayson Larson Erin    Exercises  Long Sitting Ankle Eversion with Resistance - 1 x daily - 7 x weekly - 3 sets - 10 reps  Long Sitting Ankle Inversion with Resistance - 1 x daily - 7 x weekly - 3 sets - 10 reps  Long Sitting Ankle Dorsiflexion with Anchored Resistance - 1 x daily - 7 x weekly - 3 sets - 10 reps  Long Sitting Ankle Plantar Flexion with Resistance - 1 x daily - 7 x weekly - 3 sets - 10 reps    Therapeutic Exercise and NMR EXR  [x] (52494) Provided verbal/tactile cueing for activities related to strengthening, flexibility, endurance, ROM for improvements in  [x] LE / Lumbar: LE, proximal hip, and core control with self care, mobility, lifting, ambulation. [] UE / Cervical: cervical, postural, scapular, scapulothoracic and UE control with self care, reaching, carrying, lifting, house/yardwork, driving, computer work.  [] (47787) Provided verbal/tactile cueing for activities related to improving balance, coordination, kinesthetic sense, posture, motor skill, proprioception to assist with   [] LE / lumbar: LE, proximal hip, and core control in self care, mobility, lifting, ambulation and eccentric single leg control. [] UE / cervical: cervical, scapular, scapulothoracic and UE control with self care, reaching, carrying, lifting, house/yardwork, driving, computer work.   [] (45124) Therapist is in constant attendance of 2 or more patients providing skilled therapy interventions, but not providing any significant amount of measurable one-on-one time to either patient, for improvements in  [] LE / lumbar: LE, proximal hip, and core control in self care, mobility, lifting, ambulation and eccentric single leg control. [] UE / cervical: cervical, scapular, scapulothoracic and UE control with self care, reaching, carrying, lifting, house/yardwork, driving, computer work.      NMR and Therapeutic Activities:    [] (08489 or ) Provided verbal/tactile cueing for activities related to improving balance, coordination, kinesthetic sense, posture, motor skill, proprioception and motor activation to allow for proper function of   [] LE: / Lumbar core, proximal hip and LE with self care and ADLs  [] UE / Cervical: cervical, postural, scapular, scapulothoracic and UE control with self care, carrying, lifting, driving, computer work.   [] (94923) Gait Re-education- Provided training and instruction to the patient for proper LE, core and proximal hip recruitment and positioning and eccentric body weight control with ambulation re-education including up and down stairs     Home Management Training / Self Care:  [] (74559) Provided self-care/home management training related to activities of daily living and compensatory training, and/or use of adaptive equipment for improvement with: ADLs and compensatory training, meal preparation, safety procedures and instruction in use of adaptive equipment, including bathing, grooming, dressing, personal hygiene, basic household cleaning and chores.      Home Exercise Program:    [x] (38418) Reviewed/Progressed HEP activities related to strengthening, flexibility, endurance, ROM of   [x] LE / Lumbar: core, proximal hip and LE for functional self-care, mobility, lifting and ambulation/stair navigation   [] UE / Cervical: cervical, postural, scapular, scapulothoracic and UE control with self care, reaching, carrying, lifting, house/yardwork, driving, computer work  [] (61972)Reviewed/Progressed HEP activities related to improving balance, coordination, kinesthetic sense, posture, motor skill, proprioception of   [] LE: core, proximal hip and LE for self care, mobility, lifting, and ambulation/stair navigation    [] UE / Cervical: cervical, postural,  scapular, scapulothoracic and UE control with self care, reaching, carrying, lifting, house/yardwork, driving, computer work    Manual Treatments:  PROM / STM / Oscillations-Mobs:  G-I, II, III, IV (PA's, Inf., Post.)  [] (65699) Provided manual therapy to mobilize LE, proximal hip and/or LS spine soft tissue/joints for the purpose of modulating pain, promoting relaxation,  increasing ROM, reducing/eliminating soft tissue swelling/inflammation/restriction, improving soft tissue extensibility and allowing for proper ROM for normal function with   [] LE / lumbar: self care, mobility, lifting and ambulation. [] UE / Cervical: self care, reaching, carrying, lifting, house/yardwork, driving, computer work. Modalities:  [] (91349) Vasopneumatic compression: Utilized vasopneumatic compression to decrease edema / swelling for the purpose of improving mobility and quad tone / recruitment which will allow for increased overall function including but not limited to self-care, transfers, ambulation, and ascending / descending stairs. Charges:  Timed Code Treatment Minutes: 45   Total Treatment Minutes: 55     [] EVAL - LOW (57155)   [] EVAL - MOD (57317)  [] EVAL - HIGH (30065)  [] RE-EVAL (92757)  [x] QW(53339) x  1     [] Ionto  [x] NMR (55794) x1       [x] Vaso  [x] Manual (22841) x  1     [] Ultrasound  [] TA x 1       [] Mech Traction (88486)  [] Aquatic Therapy x     [] ES (un) (39803):   [] Home Management Training x  [] ES(attended) (79385)   [] Dry Needling 1-2 muscles (95490):  [] Dry Needling 3+ muscles (586026)  [] Group:      [] Other: Gait x 1    GOALS:   Patient stated goal: \"back to walking\" and \"back to normal life\"   [] Progressing: [] Met: [] Not Met: [] Adjusted     Therapist goals for Patient:   Short Term Goals: To be achieved in: 2 weeks  1. Independent in HEP and progression per patient tolerance, in order to prevent re-injury. [] Progressing: [] Met: [] Not Met: [] Adjusted  2. Patient will have a decrease in pain to facilitate improvement in movement, function, and ADLs as indicated by Functional Deficits. [] Progressing: [] Met: [] Not Met: [] Adjusted     Long Term Goals: To be achieved in: 8 weeks  1.  FOTO score of at least NPV to assist with reaching prior level of function. [] Progressing: [] Met: [] Not Met: [] Adjusted  2. Patient will demonstrate increased L ankle DF AROM to 8 deg or greater to allow for increased heel strike during gait and less difficulty with descending stairs. [] Progressing: [] Met: [] Not Met: [] Adjusted  3. Patient will demonstrate an increase in L gross ankle strength to at least 5/5 for improved ankle stability  with functional tasks such as walking and stairs. [] Progressing: [] Met: [] Not Met: [] Adjusted  4. Patient will return to functional activities including climbing a full flight of stairs with 1-2 HR and reciprocal pattern to promote independence and safety at home. [] Progressing: [] Met: [] Not Met: [] Adjusted  5. Patient to demonstrate normal gait including heel strike and toe off on L with equal step length without AD and ability to complete 6 min walk test to improve endurance and return to PLOF. [] Progressing: [] Met: [] Not Met: [] Adjusted         Overall Progression Towards Functional goals/ Treatment Progress Update:  [] Patient is progressing as expected towards functional goals listed. [] Progression is slowed due to complexities/Impairments listed. [] Progression has been slowed due to co-morbidities. [x] Plan just implemented, too soon to assess goals progression <30days   [] Goals require adjustment due to lack of progress  [] Patient is not progressing as expected and requires additional follow up with physician  [] Other    Persisting Functional Limitations/Impairments:  []Sleeping []Sitting               [x]Standing []Transfers        [x]Walking []Kneeling               [x]Stairs []Squatting / bending   []ADLs []Reaching  [x]Lifting  []Housework  []Driving []Job related tasks  []Sports/Recreation []Other:        ASSESSMENT:  Pt continues with antalgic gait with decreased PF/DF of L ankle.  Pt foot/ankle weakness affecting gait , increased to LIME TB today for 4 way ankle and added to HEP. Vaso added this date to decrease pain and swelling, measurements listed above. Will continue with strength and stability of L ankle to normalize pt gait. Treatment/Activity Tolerance:  [x] Patient able to complete tx [] Patient limited by fatigue  [] Patient limited by pain  [] Patient limited by other medical complications  [] Other:     Prognosis: [x] Good [] Fair  [] Poor    Patient Requires Follow-up: [x] Yes  [] No    Plan for next treatment session:    PLAN: See eval. PT 1-2x / week for 8 weeks. [x] Continue per plan of care [] Alter current plan (see comments)  [] Plan of care initiated [] Hold pending MD visit [] Discharge    Electronically signed by: Jean Reed PT DPT    Note: If patient does not return for scheduled/ recommended follow up visits, this note will serve as a discharge from care along with most recent update on progress.

## 2022-12-06 ENCOUNTER — HOSPITAL ENCOUNTER (OUTPATIENT)
Dept: PHYSICAL THERAPY | Age: 52
Setting detail: THERAPIES SERIES
Discharge: HOME | End: 2022-12-06
Payer: COMMERCIAL

## 2022-12-06 PROCEDURE — 97110 THERAPEUTIC EXERCISES: CPT

## 2022-12-06 PROCEDURE — 97112 NEUROMUSCULAR REEDUCATION: CPT

## 2022-12-06 PROCEDURE — 97116 GAIT TRAINING THERAPY: CPT

## 2022-12-06 NOTE — FLOWSHEET NOTE
[facility]  Phone: [phone]   Fax: [phone]    Physical Therapy Daily Treatment Note    Date:  2022     Patient Name:  Anayeli Flores    :  1970  MRN: [id number]  Medical Diagnosis:  Left foot pain [M79.672]  Displaced fracture of fifth metatarsal bone, left foot, initial encounter for closed fracture [S92.352A]  Treatment Diagnosis: L foot pain, decreased L ankle ROM and strength, impaired gait and ability to climb stairs, decreased standing and walking tolerance, L ankle swelling  Insurance/Certification information:  PT Insurance Information: BCBS: all codes billable, $30 copay, no coinsurance, 50 visit limit combined per lisa year, hard max, none used, no auth  Physician Information:  Anibal Hope MD    Plan of care signed (Y/N): [x]  Yes []  No     Date of Patient follow up with Physician:      Progress Report: []  Yes  [x]  No     Date Range for reporting period:  Beginnin2022 - signed  Ending:     Progress report due (10 Rx/or 30 days whichever is less): visit #10 or       Recertification due (POC duration/ or 90 days whichever is less): visit #18 or 23     Visit # Insurance Allowable Auth required? Date Range    50 combined  Hard max []  Yes  [x]  No N/a       Latex Allergy:  [x]NO      []YES  Preferred Language for Healthcare:   [x]English       []other:    Functional Scale:           Date assessed:  FOTO physical FS primary measure score = NPV; risk adjusted = NPV  NPV    Pain level:  3/10     SUBJECTIVE:  Pt reports last week she had to go to several stores [employer], [facility]) and after walking through several stores she was in a lot of pain and had to use her knee scooter again.      OBJECTIVE: : Pt ambulating with decreased L ankle DF/PF      RESTRICTIONS/PRECAUTIONS: 5th met fracture = 22    Exercises/Interventions:     Therapeutic Exercises (65100) Resistance / level Sets/sec Reps Notes Bike  SciFit Stepper   L 1 5'      IB - gastroc  IB- soleus   2 30''     HR - neutral  2 10    HSS  2 30\" B    4 way ankle  LIME  12/2 ^           Soleus press -BLE 30#    Leg press SL 35#                         Therapeutic Activities (47947)                                   Gait (85952)                     Stairs - step ups fwd 6\" 1 10 L No UE support   Stairs - step ups lat 6\" 1 10 L No UE support    Stairs - fwd step downs 4\" 1 10 B    Ekaterina- 1 foot step over with emphasis on heel strike/toe off 1 ekaternia 1 10 ea                          Neuromuscular Re-ed (30581)       Single leg balance Airex 15\" x 3 B  12/6: Fingertip support needed L, no UE support on R   Small fitter board a/p, R/L, CW      Standing BAPS board   X10 ea L  All directions   Tiltboard A/P and M/L taps  1 min ea     Tandem stance Airex 2 x 30\" B  12/6: intermittent fingertip support   NBOS EC Airex 2 x 30\"   12/6: Fingertip support                 Manual Intervention (38723)       PROM L ankle , STM, TC mobs                                             Modalities: 11/11: good candidate for vaso    11/29, 12/2 : vaso L ankle  10 min , low pressure, moderate temp in reclined sitting     Girth (cm) L - pre vaso  L - post-vaso   Figure 8 51.5 cm 48.5 cm       Pt. Education:  11/11/2022  -patient educated on diagnosis, prognosis and expectations for rehab  -all patient questions were answered  -educated on compression (wear, wash, what to look out for when wearing), ice, coming down stairs in neutral instead of sideways     Home Exercise Program:  Access Code: [email or web address]  URL: [email or web address]  Date: 11/11/2022  Prepared by: Drake Live     Exercises  Supine Heel Slide - 2-3 x daily - 7 x weekly - 1 sets - 10 reps  Supine Quad Set - 2-3 x daily - 7 x weekly - 3 sets - 10 reps  Seated Heel Raise - 2-3 x daily - 7 x weekly - 3 sets - 10 reps  Seated Toe Raise - 2-3 x daily - 7 x weekly - 3 sets - 10 reps  Seated Ankle Circles - 2-3 x daily - 7 x weekly - 3 sets - 10 reps  Towel Scrunches - 2-3 x daily - 7 x weekly - 3 sets - 10 reps  Ankle Inversion Eversion Towel Slide - 2-3 x daily - 7 x weekly - 3 sets - 10     Access Code: [email or web address]  URL: [email or web address]  Date: 11/18/2022  Prepared by: Lana Weber    Exercises  Long Sitting Ankle Eversion with Resistance - 1 x daily - 7 x weekly - 3 sets - 10 reps  Long Sitting Ankle Inversion with Resistance - 1 x daily - 7 x weekly - 3 sets - 10 reps  Long Sitting Ankle Dorsiflexion with Anchored Resistance - 1 x daily - 7 x weekly - 3 sets - 10 reps  Long Sitting Ankle Plantar Flexion with Resistance - 1 x daily - 7 x weekly - 3 sets - 10 reps    Therapeutic Exercise and NMR EXR  [x] (29165) Provided verbal/tactile cueing for activities related to strengthening, flexibility, endurance, ROM for improvements in  [x] LE / Lumbar: LE, proximal hip, and core control with self care, mobility, lifting, ambulation. [] UE / Cervical: cervical, postural, scapular, scapulothoracic and UE control with self care, reaching, carrying, lifting, house/yardwork, driving, computer work.  [] (12462) Provided verbal/tactile cueing for activities related to improving balance, coordination, kinesthetic sense, posture, motor skill, proprioception to assist with   [] LE / lumbar: LE, proximal hip, and core control in self care, mobility, lifting, ambulation and eccentric single leg control. [] UE / cervical: cervical, scapular, scapulothoracic and UE control with self care, reaching, carrying, lifting, house/yardwork, driving, computer work.   [] (53824) Therapist is in constant attendance of 2 or more patients providing skilled therapy interventions, but not providing any significant amount of measurable one-on-one time to either patient, for improvements in  [] LE / lumbar: LE, proximal hip, and core control in self care, mobility, lifting, ambulation and eccentric single leg control.    [] UE / cervical: cervical, scapular, scapulothoracic and UE control with self care, reaching, carrying, lifting, house/yardwork, driving, computer work. NMR and Therapeutic Activities:    [] (55617 or 21994) Provided verbal/tactile cueing for activities related to improving balance, coordination, kinesthetic sense, posture, motor skill, proprioception and motor activation to allow for proper function of   [] LE: / Lumbar core, proximal hip and LE with self care and ADLs  [] UE / Cervical: cervical, postural, scapular, scapulothoracic and UE control with self care, carrying, lifting, driving, computer work.   [] (54208) Gait Re-education- Provided training and instruction to the patient for proper LE, core and proximal hip recruitment and positioning and eccentric body weight control with ambulation re-education including up and down stairs     Home Management Training / Self Care:  [] (82467) Provided self-care/home management training related to activities of daily living and compensatory training, and/or use of adaptive equipment for improvement with: ADLs and compensatory training, meal preparation, safety procedures and instruction in use of adaptive equipment, including bathing, grooming, dressing, personal hygiene, basic household cleaning and chores.      Home Exercise Program:    [x] (65706) Reviewed/Progressed HEP activities related to strengthening, flexibility, endurance, ROM of   [x] LE / Lumbar: core, proximal hip and LE for functional self-care, mobility, lifting and ambulation/stair navigation   [] UE / Cervical: cervical, postural, scapular, scapulothoracic and UE control with self care, reaching, carrying, lifting, house/yardwork, driving, computer work  [] (27398)Reviewed/Progressed HEP activities related to improving balance, coordination, kinesthetic sense, posture, motor skill, proprioception of   [] LE: core, proximal hip and LE for self care, mobility, lifting, and ambulation/stair navigation    [] UE / Cervical: cervical, postural,  scapular, scapulothoracic and UE control with self care, reaching, carrying, lifting, house/yardwork, driving, computer work    Manual Treatments:  PROM / STM / Oscillations-Mobs:  G-I, II, III, IV (PA's, Inf., Post.)  [] (80085) Provided manual therapy to mobilize LE, proximal hip and/or LS spine soft tissue/joints for the purpose of modulating pain, promoting relaxation,  increasing ROM, reducing/eliminating soft tissue swelling/inflammation/restriction, improving soft tissue extensibility and allowing for proper ROM for normal function with   [] LE / lumbar: self care, mobility, lifting and ambulation. [] UE / Cervical: self care, reaching, carrying, lifting, house/yardwork, driving, computer work. Modalities:  [] (51870) Vasopneumatic compression: Utilized vasopneumatic compression to decrease edema / swelling for the purpose of improving mobility and quad tone / recruitment which will allow for increased overall function including but not limited to self-care, transfers, ambulation, and ascending / descending stairs. Charges:  Timed Code Treatment Minutes: 43   Total Treatment Minutes: 43     [] EVAL - LOW (47607)   [] EVAL - MOD (20141)  [] EVAL - HIGH (30685)  [] RE-EVAL (15740)  [x] IG(84440) x  1     [] Ionto  [x] NMR (99715) x1       [] Vaso  [] Manual (43965) x  1     [] Ultrasound  [] TA x 1       [] Mech Traction (15164)  [] Aquatic Therapy x     [] ES (un) (65465):   [] Home Management Training x  [] ES(attended) (14295)   [] Dry Needling 1-2 muscles (50016):  [] Dry Needling 3+ muscles (997787)  [] Group:      [x] Other: Gait x 1    GOALS:   Patient stated goal: \"back to walking\" and \"back to normal life\"   [] Progressing: [] Met: [] Not Met: [] Adjusted     Therapist goals for Patient:   Short Term Goals: To be achieved in: 2 weeks  1. Independent in HEP and progression per patient tolerance, in order to prevent re-injury.    [] Progressing: [] Met: [] Not Met: [] Adjusted  2. Patient will have a decrease in pain to facilitate improvement in movement, function, and ADLs as indicated by Functional Deficits. [] Progressing: [] Met: [] Not Met: [] Adjusted     Long Term Goals: To be achieved in: 8 weeks  1. FOTO score of at least NPV to assist with reaching prior level of function. [] Progressing: [] Met: [] Not Met: [] Adjusted  2. Patient will demonstrate increased L ankle DF AROM to 8 deg or greater to allow for increased heel strike during gait and less difficulty with descending stairs. [] Progressing: [] Met: [] Not Met: [] Adjusted  3. Patient will demonstrate an increase in L gross ankle strength to at least 5/5 for improved ankle stability  with functional tasks such as walking and stairs. [] Progressing: [] Met: [] Not Met: [] Adjusted  4. Patient will return to functional activities including climbing a full flight of stairs with 1-2 HR and reciprocal pattern to promote independence and safety at home. [] Progressing: [] Met: [] Not Met: [] Adjusted  5. Patient to demonstrate normal gait including heel strike and toe off on L with equal step length without AD and ability to complete 6 min walk test to improve endurance and return to PLOF. [] Progressing: [] Met: [] Not Met: [] Adjusted         Overall Progression Towards Functional goals/ Treatment Progress Update:  [] Patient is progressing as expected towards functional goals listed. [] Progression is slowed due to complexities/Impairments listed. [] Progression has been slowed due to co-morbidities.   [x] Plan just implemented, too soon to assess goals progression <30days   [] Goals require adjustment due to lack of progress  [] Patient is not progressing as expected and requires additional follow up with physician  [] Other    Persisting Functional Limitations/Impairments:  []Sleeping []Sitting               [x]Standing []Transfers        [x]Walking []Kneeling               [x]Stairs []Squatting / bending   []ADLs []Reaching  [x]Lifting  []Housework  []Driving []Job related tasks  []Sports/Recreation []Other:        ASSESSMENT:  Continued with gait training to increase heel strike and toe off during gait as pt continues to walk with decreased DF/PF of L ankle. She was encouraged to walk with this pattern as much as possible to normalize gait pattern. Would continued to benefit from skilled PT to increase ankle strength and return pt to PLOF. Treatment/Activity Tolerance:  [x] Patient able to complete tx [] Patient limited by fatigue  [] Patient limited by pain  [] Patient limited by other medical complications  [] Other:     Prognosis: [x] Good [] Fair  [] Poor    Patient Requires Follow-up: [x] Yes  [] No    Plan for next treatment session:    PLAN: See eval. PT 1-2x / week for 8 weeks. [x] Continue per plan of care [] Alter current plan (see comments)  [] Plan of care initiated [] Hold pending MD visit [] Discharge    Electronically signed by: Parker Soriano, PT DPT    Note: If patient does not return for scheduled/ recommended follow up visits, this note will serve as a discharge from care along with most recent update on progress.

## 2022-12-16 ENCOUNTER — HOSPITAL ENCOUNTER (OUTPATIENT)
Dept: PHYSICAL THERAPY | Age: 52
Setting detail: THERAPIES SERIES
Discharge: HOME | End: 2022-12-16
Payer: COMMERCIAL

## 2022-12-16 PROCEDURE — 97116 GAIT TRAINING THERAPY: CPT

## 2022-12-16 PROCEDURE — 97110 THERAPEUTIC EXERCISES: CPT

## 2022-12-16 NOTE — FLOWSHEET NOTE
[address] [facility] Physical Therapy  Phone: [phone]   Fax: [phone]    Physical Therapy Daily Treatment Note    Date:  2022     Patient Name:  Vicenta Maher YOB: 1970  MRN: [id number]  Medical Diagnosis:  Left foot pain [M79.672]  Displaced fracture of fifth metatarsal bone, left foot, initial encounter for closed fracture [S92.691A]  Treatment Diagnosis: L foot pain, decreased L ankle ROM and strength, impaired gait and ability to climb stairs, decreased standing and walking tolerance, L ankle swelling  Insurance/Certification information:  PT Insurance Information: BCBS: all codes billable, $30 copay, no coinsurance, 50 visit limit combined per lisa year, hard max, none used, no auth  Physician Information:  Wendi Saha MD    Plan of care signed (Y/N): [x]  Yes []  No     Date of Patient follow up with Physician: 22      Progress Report: [x]  Yes  []  No     Date Range for reporting period:  Beginnin2022 - signed  PN:   Ending:     Progress report due (10 Rx/or 30 days whichever is less): visit #10 or       Recertification due (POC duration/ or 90 days whichever is less): visit #18 or 23     Visit # Insurance Allowable Auth required? Date Range    50 combined  Hard max []  Yes  [x]  No N/a       Latex Allergy:  [x]NO      []YES  Preferred Language for Healthcare:   [x]English       []other:    Functional Scale:           Date assessed:  [facility] physical FS primary measure score = 63; risk adjusted = 52  22    Pain level:  310     SUBJECTIVE:  Pt reports that she feels her ROM is definitely improving. Reports no issue with going up stairs, but going down is very uncomfortable and she must go slowly, step by step. Pt reports her ankle still feels very stiff. Pt reports skin color is nearly same as R foot.      OBJECTIVE: : Pt ambulating with decreased L ankle DF/PF    :    AROM     L R   Knee Flexion 133 130   Knee Extension -3 (hyperextension) -3   Dorsiflexion  12 14   Plantarflexion  56 62   Inversion  50 54   Eversion  34 with compensation into DF 32     Strength (0-5) / Myotomes Left   Right   Quads (L2-4) 5 5   Hamstrings 5 5   Ankle Dorsiflexion (L4-5) 5 * 5   Ankle Plantarflexion (S1-2) 5 5   Ankle Inversion 4  5   Ankle Eversion (S1-2) 4  5   Great Toe Extension (L5)               Girth (cm)       Figure 8 50 cm 49.4 cm     Stairs: 4\" steps (6\" not available): 4 steps x 2 trials   1st trial: up reciprocal, down nonreciprocal leading with R LE   2nd trial: up reciprocal, down nonreciprocal leading with L LE      RESTRICTIONS/PRECAUTIONS: 5th met fracture = 7/11/22    Exercises/Interventions:     Therapeutic Exercises (55999) Resistance / level Sets/sec Reps Notes   Bike  SciFit Stepper   L 1     IB - gastroc  IB- soleus   2 30''     HR - neutral  2 10    HSS  2 30\" B    4 way ankle  LIME  12/2 ^           Soleus press -BLE 30#    Leg press SL 35#           Assessment for PN - MMT, ROM, FOTO, discussed progress towards goals, remaining limitations 12/6             Therapeutic Activities (55538)                                   Gait (21117)                     Stairs - step ups fwd 6\" No UE support   Stairs - step ups lat 6\" No UE support    Stairs - fwd step downs 4\"    Ekaterina- 1 foot step over with emphasis on heel strike/toe off 1 ekaterina           For PN: stairs x 2 trials (see above), discussion re: slow gait to emphasize heel<>toe pattern 12/16             Neuromuscular Re-ed (55182)       Single leg balance Airex  12/6: Fingertip support needed L, no UE support on R   Small fitter board a/p, R/L, CW      Standing BAPS board    All directions   Tiltboard A/P and M/L taps      Tandem stance Airex  12/6: intermittent fingertip support   NBOS EC Airex  12/6: Fingertip support                 Manual Intervention (02264)       PROM L ankle , STM, TC mobs                                             Modalities: *Pt does not like vaso*    11/11: good candidate for vaso    11/29, 12/2 : vaso L ankle  10 min , low pressure, moderate temp in reclined sitting     Girth (cm) L - pre vaso  L - post-vaso   Figure 8 51.5 cm 48.5 cm       Pt. Education:  11/11/2022  -patient educated on diagnosis, prognosis and expectations for rehab  -all patient questions were answered  -educated on compression (wear, wash, what to look out for when wearing), ice, coming down stairs in neutral instead of sideways     Home Exercise Program:  Access Code: [email or web address]  URL: [email or web address]  Date: 11/11/2022  Prepared by: Susan Isaac     Exercises  Supine Heel Slide - 2-3 x daily - 7 x weekly - 1 sets - 10 reps  Supine Quad Set - 2-3 x daily - 7 x weekly - 3 sets - 10 reps  Seated Heel Raise - 2-3 x daily - 7 x weekly - 3 sets - 10 reps  Seated Toe Raise - 2-3 x daily - 7 x weekly - 3 sets - 10 reps  Seated Ankle Circles - 2-3 x daily - 7 x weekly - 3 sets - 10 reps  Towel Scrunches - 2-3 x daily - 7 x weekly - 3 sets - 10 reps  Ankle Inversion Eversion Towel Slide - 2-3 x daily - 7 x weekly - 3 sets - 10     Access Code: [email or web address]  URL: [email or web address]  Date: 11/18/2022  Prepared by: Shari Wong    Exercises  Long Sitting Ankle Eversion with Resistance - 1 x daily - 7 x weekly - 3 sets - 10 reps  Long Sitting Ankle Inversion with Resistance - 1 x daily - 7 x weekly - 3 sets - 10 reps  Long Sitting Ankle Dorsiflexion with Anchored Resistance - 1 x daily - 7 x weekly - 3 sets - 10 reps  Long Sitting Ankle Plantar Flexion with Resistance - 1 x daily - 7 x weekly - 3 sets - 10 reps    Therapeutic Exercise and NMR EXR  [x] (24614) Provided verbal/tactile cueing for activities related to strengthening, flexibility, endurance, ROM for improvements in  [x] LE / Lumbar: LE, proximal hip, and core control with self care, mobility, lifting, ambulation.   [] UE / Cervical: cervical, postural, scapular, scapulothoracic and UE control with self care, reaching, carrying, lifting, house/yardwork, driving, computer work.  [] (23768) Provided verbal/tactile cueing for activities related to improving balance, coordination, kinesthetic sense, posture, motor skill, proprioception to assist with   [] LE / lumbar: LE, proximal hip, and core control in self care, mobility, lifting, ambulation and eccentric single leg control. [] UE / cervical: cervical, scapular, scapulothoracic and UE control with self care, reaching, carrying, lifting, house/yardwork, driving, computer work.   [] (50083) Therapist is in constant attendance of 2 or more patients providing skilled therapy interventions, but not providing any significant amount of measurable one-on-one time to either patient, for improvements in  [] LE / lumbar: LE, proximal hip, and core control in self care, mobility, lifting, ambulation and eccentric single leg control. [] UE / cervical: cervical, scapular, scapulothoracic and UE control with self care, reaching, carrying, lifting, house/yardwork, driving, computer work. NMR and Therapeutic Activities:    [] (01217 or 59256) Provided verbal/tactile cueing for activities related to improving balance, coordination, kinesthetic sense, posture, motor skill, proprioception and motor activation to allow for proper function of   [] LE: / Lumbar core, proximal hip and LE with self care and ADLs  [] UE / Cervical: cervical, postural, scapular, scapulothoracic and UE control with self care, carrying, lifting, driving, computer work.    [x] (51091) Gait Re-education- Provided training and instruction to the patient for proper LE, core and proximal hip recruitment and positioning and eccentric body weight control with ambulation re-education including up and down stairs     Home Management Training / Self Care:  [] (60682) Provided self-care/home management training related to activities of daily living and compensatory training, and/or use of adaptive equipment and ascending / descending stairs. Charges:  Timed Code Treatment Minutes: 50   Total Treatment Minutes: 50     [] EVAL - LOW (76040)   [] EVAL - MOD (45227)  [] EVAL - HIGH (06571)  [] RE-EVAL (23193)  [x] QT(29877) x  2     [] Ionto  [] NMR (75075) x       [] Vaso  [] Manual (72067) x       [] Ultrasound  [] TA x 1       [] Mech Traction (39343)  [] Aquatic Therapy x     [] ES (un) (30216):   [] Home Management Training x  [] ES(attended) (40931)   [] Dry Needling 1-2 muscles (32338):  [] Dry Needling 3+ muscles (320123)  [] Group:      [x] Other: Gait x 1    GOALS:   Patient stated goal: \"back to walking\" and \"back to normal life\"   [] Progressing: [] Met: [] Not Met: [] Adjusted     Therapist goals for Patient:   Short Term Goals: To be achieved in: 2 weeks  1. Independent in HEP and progression per patient tolerance, in order to prevent re-injury. [] Progressing: [x] Met: [] Not Met: [] Adjusted  2. Patient will have a decrease in pain to facilitate improvement in movement, function, and ADLs as indicated by Functional Deficits. [x] Progressing: [] Met: [] Not Met: [] Adjusted     Long Term Goals: To be achieved in: 8 weeks  1. FOTO score of at least 71 to assist with reaching prior level of function. [] Progressing: [] Met: [] Not Met: [x] Adjusted- New goal as of 12/16  2. Patient will demonstrate increased L ankle DF AROM to 8 deg or greater to allow for increased heel strike during gait and less difficulty with descending stairs. [] Progressing: [x] Met: [] Not Met: [] Adjusted  3. Patient will demonstrate an increase in L gross ankle strength to at least 5/5 for improved ankle stability  with functional tasks such as walking and stairs. [x] Progressing: [] Met: [] Not Met: [] Adjusted  4. Patient will return to functional activities including climbing a full flight of stairs with 1-2 HR and reciprocal pattern to promote independence and safety at home.      [x] Progressing: [] Met: [] Not Met: [] Adjusted  5. Patient to demonstrate normal gait including heel strike and toe off on L with equal step length without AD and ability to complete 6 min walk test to improve endurance and return to PLOF. [x] Progressing: [] Met: [] Not Met: [] Adjusted         Overall Progression Towards Functional goals/ Treatment Progress Update:  [] Patient is progressing as expected towards functional goals listed. [] Progression is slowed due to complexities/Impairments listed. [] Progression has been slowed due to co-morbidities. [x] Plan just implemented, too soon to assess goals progression <30days   [] Goals require adjustment due to lack of progress  [] Patient is not progressing as expected and requires additional follow up with physician  [] Other    Persisting Functional Limitations/Impairments:  []Sleeping []Sitting               [x]Standing []Transfers        [x]Walking []Kneeling               [x]Stairs []Squatting / bending   []ADLs []Reaching  [x]Lifting  []Housework  []Driving []Job related tasks  []Sports/Recreation []Other:        ASSESSMENT:  PN completed this date. Patient has significantly increased her L ankle ROM and strength. She still ambulates with decreased ankle PF/DF, although now she has the available ROM. She also still has difficulty with descending stairs and ambulating prolonged distances. Pt would benefit from continued skilled PT to help improve her gait and return her to PLOF. Treatment/Activity Tolerance:  [x] Patient able to complete tx [] Patient limited by fatigue  [] Patient limited by pain  [] Patient limited by other medical complications  [] Other:     Prognosis: [x] Good [] Fair  [] Poor    Patient Requires Follow-up: [x] Yes  [] No    Plan for next treatment session:    PLAN: See eval. PT 1-2x / week for 8 weeks.    [x] Continue per plan of care [] Alter current plan (see comments)  [] Plan of care initiated [] Hold pending MD visit [] Discharge    Electronically signed by: Madeleine Estrella, PT DPT    Note: If patient does not return for scheduled/ recommended follow up visits, this note will serve as a discharge from care along with most recent update on progress.

## 2022-12-20 ENCOUNTER — OFFICE VISIT (OUTPATIENT)
Dept: ORTHOPEDIC SURGERY | Age: 52
End: 2022-12-20
Payer: COMMERCIAL

## 2022-12-20 VITALS — WEIGHT: 146 LBS | HEIGHT: 60 IN | BODY MASS INDEX: 28.66 KG/M2

## 2022-12-20 DIAGNOSIS — S92.352A DISPLACED FRACTURE OF FIFTH METATARSAL BONE, LEFT FOOT, INITIAL ENCOUNTER FOR CLOSED FRACTURE: Primary | ICD-10-CM

## 2022-12-20 PROCEDURE — 99213 OFFICE O/P EST LOW 20 MIN: CPT | Performed by: ORTHOPAEDIC SURGERY

## 2022-12-20 NOTE — PROGRESS NOTES
CHIEF COMPLAINT: Left foot pain/ 5th MT base fracture. DATE OF INJURY: 2022    HISTORY:  Ms. Olvin Viveros 46 y.o. [address] [place] [address] female  presents today for follow-up of left foot pain. She was initially seen on 2022 as a consultation request from Guera Walker MD for evaluation of a left foot injury which occurred when she fell rolling her left foot while walking. She was in [address] [place] [address] at the time with family. She is complaining of lateral foot pain and swelling. Rates pain a 3/10 VAS. This is better with elevation and worse with bearing any wt. The pain is mildly tender and not radiating. No other complaint. She was seen 1st at an ER in [address] [place] [address], where she was x-rayed and splinted and asked to f/u with orthopaedics. She was told that it would take at least 6 months to heal and has been nonweightbearing since. She has been full weightbearing since 2022 when we instructed her to discontinue the boot. She is currently not working. Denies smoking. She has been working with physical therapy and has 3 visits left.     Past Medical History:   Diagnosis Date    Allergic rhinitis 10/25/2016    Hypercholesterolemia 2021       Past Surgical History:   Procedure Laterality Date     SECTION      x 2       Social History     Socioeconomic History    Marital status:      Spouse name: Not on file    Number of children: Not on file    Years of education: Not on file    Highest education level: Not on file   Occupational History    Not on file   Tobacco Use    Smoking status: Never    Smokeless tobacco: Never   Substance and Sexual Activity    Alcohol use: No    Drug use: Not on file    Sexual activity: Not on file   Other Topics Concern    Not on file   Social History Narrative    Not on file     Social Determinants of Health     Financial Resource Strain: Low Risk     Difficulty of Paying Living Expenses: Not hard at all   Food Insecurity: No Food Insecurity    Worried About Running Out of Food in the Last Year: Never true    Ran Out of Food in the Last Year: Never true   Transportation Needs: Not on file   Physical Activity: Not on file   Stress: Not on file   Social Connections: Not on file   Intimate Partner Violence: Not on file   Housing Stability: Not on file       Family History   Problem Relation Age of Onset    Diabetes Father     Hypertension Father     Stomach Cancer Paternal Grandfather     Coronary Art Dis Mother     Hypotension Mother        No current outpatient medications on file prior to visit. No current facility-administered medications on file prior to visit. Pertinent items are noted in HPI  Review of systems reviewed from Patient History Form and available in the patient's chart under the Media tab. PHYSICAL EXAMINATION:  Ms. Santiago is a very pleasant 46 y.o. [address] female who presents today in no acute distress, awake, alert, and oriented. She is well dressed, nourished and  groomed. Patient with normal affect. Height is  4' 11.5\" (1.511 m), weight is 146 lb (66.2 kg), Body mass index is 28.99 kg/m². Resting respiratory rate is 16. Examination of the gait, showed that the patient walks with mild limp, WB left  leg . Examination of both ankles showing a decreased range of motion of the left ankle compare to the other side. There is no swelling that can be seen, no ecchymosis over lateral side of the left foot. She  has intact sensation and good pedal pulses. She has no tenderness on deep palpation over the 5th MT base left foot. Ankle reflex 1+ bilaterally. Good strength, and no instability both upper and lower extremities. IMAGING: Anita Dayana were reviewed, dated today in office,  3 views of the left  foot, and showed a healing 5th MT base fracture, healing well. Significant osteopenia. IMPRESSION: Left 5th MT base fracture, healing well.     PLAN: I discussed that the overall alignment of this fracture is good and at this point recommend weightbearing as tolerated and gradually returning to normal activities. I discussed with the patient that I think that she would really benefit from a course of physical therapy for further strengthening and stretching. She should also be given a home exercise program.  We will see her  back in 6 weeks at which time we will get a new xray of the left foot standing.         Anne Griffin MD

## 2022-12-22 ENCOUNTER — HOSPITAL ENCOUNTER (OUTPATIENT)
Dept: PHYSICAL THERAPY | Age: 52
Setting detail: THERAPIES SERIES
Discharge: HOME | End: 2022-12-22
Payer: COMMERCIAL

## 2022-12-22 NOTE — FLOWSHEET NOTE
[facility] [address]     Physical Therapy  Cancellation/No-show Note  Patient Name:  Terrace Nageotte  :  1970   Date:  2022  Cancelled visits to date: 2  No-shows to date: 0    Patient status for today's appointment patient:  [x]  Cancelled ,   []  Rescheduled appointment  []  No-show     Reason given by patient:  []  Patient ill   []  Conflicting appointment  []  No transportation    []  Conflict with work  []  No reason given  [x]  Other:     Comments:  Patient has too much going on     Phone call information:   []  Phone call made today to patient at _ time at number provided:      []  Patient answered, conversation as follows:    []  Patient did not answer, message left as follows:  []  Phone call not made today  [x]  Phone call not needed - pt contacted us to cancel and provided reason for cancellation.      Electronically signed by:  Blank Wells PT DPT

## 2022-12-29 ENCOUNTER — HOSPITAL ENCOUNTER (OUTPATIENT)
Dept: PHYSICAL THERAPY | Age: 52
Setting detail: THERAPIES SERIES
Discharge: HOME | End: 2022-12-29
Payer: COMMERCIAL

## 2022-12-29 PROCEDURE — 97110 THERAPEUTIC EXERCISES: CPT

## 2022-12-29 PROCEDURE — 97116 GAIT TRAINING THERAPY: CPT

## 2022-12-29 NOTE — FLOWSHEET NOTE
[address] [facility]  Phone: [phone]   Fax: [phone]    Physical Therapy Daily Treatment Note    Date:  2022     Patient Name:  Wilian Elias    :  1970  MRN: [id number]  Medical Diagnosis:  Left foot pain [M79.672]  Displaced fracture of fifth metatarsal bone, left foot, initial encounter for closed fracture [S92.352A]  Treatment Diagnosis: L foot pain, decreased L ankle ROM and strength, impaired gait and ability to climb stairs, decreased standing and walking tolerance, L ankle swelling  Insurance/Certification information:  PT Insurance Information: BCBS: all codes billable, $30 copay, no coinsurance, 50 visit limit combined per lisa year, hard max, none used, no auth  Physician Information:  Amber Vergara MD    Plan of care signed (Y/N): [x]  Yes []  No     Date of Patient follow up with Physician: 22      Progress Report: []  Yes  [x]  No     Date Range for reporting period:  Beginnin2022 - signed  PN:   Ending:     Progress report due (10 Rx/or 30 days whichever is less): visit #10 or       Recertification due (POC duration/ or 90 days whichever is less): visit #18 or 23     Visit # Insurance Allowable Auth required? Date Range    50 combined  Hard max []  Yes  [x]  No N/a       Latex Allergy:  [x]NO      []YES  Preferred Language for Healthcare:   [x]English       []other:    Functional Scale:           Date assessed:  [facility] physical FS primary measure score = 63; risk adjusted = 52  22    Pain level:  310     SUBJECTIVE:  Pt reports she had been very busy over [place] and her foot was painful afterwards. She also went shopping at [facility] with her daughter yesterday and the sole of her foot was painful last night.      OBJECTIVE: : Pt ambulating with decreased L ankle DF/PF    :    AROM     L R   Knee Flexion 133 130   Knee Extension -3 (hyperextension) -3   Dorsiflexion  12 14 Plantarflexion  56 62   Inversion  50 54   Eversion  34 with compensation into DF 32     Strength (0-5) / Myotomes Left   Right   Quads (L2-4) 5 5   Hamstrings 5 5   Ankle Dorsiflexion (L4-5) 5 * 5   Ankle Plantarflexion (S1-2) 5 5   Ankle Inversion 4  5   Ankle Eversion (S1-2) 4  5   Great Toe Extension (L5)               Girth (cm)       Figure 8 50 cm 49.4 cm     Stairs: 4\" steps (6\" not available): 4 steps x 2 trials   1st trial: up reciprocal, down nonreciprocal leading with R LE   2nd trial: up reciprocal, down nonreciprocal leading with L LE    12/29: Per Dr. Evelio Em note from 12/20  IMAGING: Xray's were reviewed, dated today in office,  3 views of the left  foot, and showed a healing 5th MT base fracture, healing well. Significant osteopenia.    PT observation: pt with less antalgic gait, but still landing with flat foot instead of heel strike       RESTRICTIONS/PRECAUTIONS: 5th met fracture = 7/11/22    Exercises/Interventions:     Therapeutic Exercises (07936) Resistance / level Sets/sec Reps Notes   Bike  SciFit Stepper / Nustep   L 1 / L 3  5'     IB - gastroc  IB- soleus   2 27''  30\" B    HR - neutral  30\"     HSS  2 30\" B    4 way ankle  LIME  12/2 ^           Soleus press -BLE 30#    Leg press SL 35#                         Therapeutic Activities (66697)                                   Gait (68381)                     Stairs - step ups fwd 8\" 1 10 B B UE support   Stairs - step ups lat 8\" 1 10 B B UE support    Stairs - fwd step downs 4\" 1 10 B    Ekaterina- 1 foot step over with emphasis on heel strike/toe off 1 ekaterina    Fwd step ups onto BOSU  Blue side 1 10 B Light UE assist    Stairs - up/down 4\"  6\" 4 steps  3 steps 3 trials  4 trials           Neuromuscular Re-ed (24589)       Single leg balance Airex  12/6: Fingertip support needed L, no UE support on R   Small fitter board a/p, R/L, CW      Standing BAPS board    All directions   Tiltboard A/P and M/L taps  1 min ea     Tiltboard A/P and M/L balance  1 min ea     Tandem stance Airex  12/6: intermittent fingertip support   NBOS EC Airex  12/6: Fingertip support   Squats on BOSU Blue side  10     Medial/Lateral weight shifts on BOSU Black side   10                   Manual Intervention (15079)       PROM L ankle , STM, TC mobs                                             Modalities: *Pt does not like vaso*    11/11: good candidate for vaso    11/29, 12/2 : vaso L ankle  10 min , low pressure, moderate temp in reclined sitting     Girth (cm) L - pre vaso  L - post-vaso   Figure 8 51.5 cm 48.5 cm       Pt. Education:  11/11/2022  -patient educated on diagnosis, prognosis and expectations for rehab  -all patient questions were answered  -educated on compression (wear, wash, what to look out for when wearing), ice, coming down stairs in neutral instead of sideways     Home Exercise Program:  Access Code: [email or web address]  URL: [employer]/  Date: 11/11/2022  Prepared by: Amalia Calvin     Exercises  Supine Heel Slide - 2-3 x daily - 7 x weekly - 1 sets - 10 reps  Supine Quad Set - 2-3 x daily - 7 x weekly - 3 sets - 10 reps  Seated Heel Raise - 2-3 x daily - 7 x weekly - 3 sets - 10 reps  Seated Toe Raise - 2-3 x daily - 7 x weekly - 3 sets - 10 reps  Seated Ankle Circles - 2-3 x daily - 7 x weekly - 3 sets - 10 reps  Towel Scrunches - 2-3 x daily - 7 x weekly - 3 sets - 10 reps  Ankle Inversion Eversion Towel Slide - 2-3 x daily - 7 x weekly - 3 sets - 10     Access Code: [email or web address]  URL: [email or web address]  Date: 11/18/2022  Prepared by: Nereida Dorado    Exercises  Long Sitting Ankle Eversion with Resistance - 1 x daily - 7 x weekly - 3 sets - 10 reps  Long Sitting Ankle Inversion with Resistance - 1 x daily - 7 x weekly - 3 sets - 10 reps  Long Sitting Ankle Dorsiflexion with Anchored Resistance - 1 x daily - 7 x weekly - 3 sets - 10 reps  Long Sitting Ankle Plantar Flexion with Resistance - 1 x daily - 7 x weekly - 3 sets - 10 reps    Therapeutic Exercise and NMR EXR  [x] (95079) Provided verbal/tactile cueing for activities related to strengthening, flexibility, endurance, ROM for improvements in  [x] LE / Lumbar: LE, proximal hip, and core control with self care, mobility, lifting, ambulation. [] UE / Cervical: cervical, postural, scapular, scapulothoracic and UE control with self care, reaching, carrying, lifting, house/yardwork, driving, computer work. [x] (71074) Provided verbal/tactile cueing for activities related to improving balance, coordination, kinesthetic sense, posture, motor skill, proprioception to assist with   [x] LE / lumbar: LE, proximal hip, and core control in self care, mobility, lifting, ambulation and eccentric single leg control. [] UE / cervical: cervical, scapular, scapulothoracic and UE control with self care, reaching, carrying, lifting, house/yardwork, driving, computer work.   [] (37453) Therapist is in constant attendance of 2 or more patients providing skilled therapy interventions, but not providing any significant amount of measurable one-on-one time to either patient, for improvements in  [] LE / lumbar: LE, proximal hip, and core control in self care, mobility, lifting, ambulation and eccentric single leg control. [] UE / cervical: cervical, scapular, scapulothoracic and UE control with self care, reaching, carrying, lifting, house/yardwork, driving, computer work. NMR and Therapeutic Activities:    [x] (28759 or 99917) Provided verbal/tactile cueing for activities related to improving balance, coordination, kinesthetic sense, posture, motor skill, proprioception and motor activation to allow for proper function of   [x] LE: / Lumbar core, proximal hip and LE with self care and ADLs  [] UE / Cervical: cervical, postural, scapular, scapulothoracic and UE control with self care, carrying, lifting, driving, computer work.    [x] (35674) Gait Re-education- Provided training and instruction to the patient for proper LE, core and proximal hip recruitment and positioning and eccentric body weight control with ambulation re-education including up and down stairs     Home Management Training / Self Care:  [] (52466) Provided self-care/home management training related to activities of daily living and compensatory training, and/or use of adaptive equipment for improvement with: ADLs and compensatory training, meal preparation, safety procedures and instruction in use of adaptive equipment, including bathing, grooming, dressing, personal hygiene, basic household cleaning and chores. Home Exercise Program:    [] (75585) Reviewed/Progressed HEP activities related to strengthening, flexibility, endurance, ROM of   [] LE / Lumbar: core, proximal hip and LE for functional self-care, mobility, lifting and ambulation/stair navigation   [] UE / Cervical: cervical, postural, scapular, scapulothoracic and UE control with self care, reaching, carrying, lifting, house/yardwork, driving, computer work  [] (46765)Reviewed/Progressed HEP activities related to improving balance, coordination, kinesthetic sense, posture, motor skill, proprioception of   [] LE: core, proximal hip and LE for self care, mobility, lifting, and ambulation/stair navigation    [] UE / Cervical: cervical, postural,  scapular, scapulothoracic and UE control with self care, reaching, carrying, lifting, house/yardwork, driving, computer work    Manual Treatments:  PROM / STM / Oscillations-Mobs:  G-I, II, III, IV (PA's, Inf., Post.)  [] (87607) Provided manual therapy to mobilize LE, proximal hip and/or LS spine soft tissue/joints for the purpose of modulating pain, promoting relaxation,  increasing ROM, reducing/eliminating soft tissue swelling/inflammation/restriction, improving soft tissue extensibility and allowing for proper ROM for normal function with   [] LE / lumbar: self care, mobility, lifting and ambulation.     [] UE / Cervical: self care, reaching, carrying, lifting, house/yardwork, driving, computer work. Modalities:  [] (24080) Vasopneumatic compression: Utilized vasopneumatic compression to decrease edema / swelling for the purpose of improving mobility and quad tone / recruitment which will allow for increased overall function including but not limited to self-care, transfers, ambulation, and ascending / descending stairs. Charges:  Timed Code Treatment Minutes: 43   Total Treatment Minutes: 43     [] EVAL - LOW (18345)   [] EVAL - MOD (91531)  [] EVAL - HIGH (66791)  [] RE-EVAL (21436)  [] TA(53630) x  1     [] Ionto  [] NMR (32441) x       [] Vaso  [] Manual (50118) x       [] Ultrasound  [] TA x 1       [] Mech Traction (24078)  [] Aquatic Therapy x     [] ES (un) (77419):   [] Home Management Training x  [] ES(attended) (60188)   [] Dry Needling 1-2 muscles (15352):  [] Dry Needling 3+ muscles (129235)  [] Group:      [x] Other: Gait x 2    GOALS:   Patient stated goal: \"back to walking\" and \"back to normal life\"   [] Progressing: [] Met: [] Not Met: [] Adjusted     Therapist goals for Patient:   Short Term Goals: To be achieved in: 2 weeks  1. Independent in HEP and progression per patient tolerance, in order to prevent re-injury. [] Progressing: [x] Met: [] Not Met: [] Adjusted  2. Patient will have a decrease in pain to facilitate improvement in movement, function, and ADLs as indicated by Functional Deficits. [x] Progressing: [] Met: [] Not Met: [] Adjusted     Long Term Goals: To be achieved in: 8 weeks  1. FOTO score of at least 71 to assist with reaching prior level of function. [] Progressing: [] Met: [] Not Met: [x] Adjusted- New goal as of 12/16  2. Patient will demonstrate increased L ankle DF AROM to 8 deg or greater to allow for increased heel strike during gait and less difficulty with descending stairs. [] Progressing: [x] Met: [] Not Met: [] Adjusted  3.  Patient will demonstrate an increase in L gross ankle strength to at least 5/5 for improved ankle stability  with functional tasks such as walking and stairs. [x] Progressing: [] Met: [] Not Met: [] Adjusted  4. Patient will return to functional activities including climbing a full flight of stairs with 1-2 HR and reciprocal pattern to promote independence and safety at home. [x] Progressing: [] Met: [] Not Met: [] Adjusted  5. Patient to demonstrate normal gait including heel strike and toe off on L with equal step length without AD and ability to complete 6 min walk test to improve endurance and return to PLOF. [x] Progressing: [] Met: [] Not Met: [] Adjusted         Overall Progression Towards Functional goals/ Treatment Progress Update:  [] Patient is progressing as expected towards functional goals listed. [] Progression is slowed due to complexities/Impairments listed. [] Progression has been slowed due to co-morbidities. [x] Plan just implemented, too soon to assess goals progression <30days   [] Goals require adjustment due to lack of progress  [] Patient is not progressing as expected and requires additional follow up with physician  [] Other    Persisting Functional Limitations/Impairments:  []Sleeping []Sitting               [x]Standing []Transfers        [x]Walking []Kneeling               [x]Stairs []Squatting / bending   []ADLs []Reaching  [x]Lifting  []Housework  []Driving []Job related tasks  []Sports/Recreation []Other:        ASSESSMENT:  Progressed fwd and lat step up height this date without increase in pain. Spent more time focusing on stair negotiation as this has been very difficult for patient. She was able to use a reciprocal pattern on both the 4in and 6in steps but was slow and more cautious. Cueing provided to avoid turning L foot outwards when coming down the steps. Discussed with patient [address] HEP that was provided at [facility], and a blue TB was provided to progress her ankle 4 way TB HEP.  Asked pt to focus on heel strike with gait and coming down stairs with reciprocal pattern between now and next visit. Also let pt know that she is fine to do squats for exercise. Will continue with functional strengthening and gait as tolerated to return pt to OF. Treatment/Activity Tolerance:  [x] Patient able to complete tx [] Patient limited by fatigue  [] Patient limited by pain  [] Patient limited by other medical complications  [] Other:     Prognosis: [x] Good [] Fair  [] Poor    Patient Requires Follow-up: [x] Yes  [] No    Plan for next treatment session:    PLAN: See eval. PT 1-2x / week for 8 weeks. [x] Continue per plan of care [] Alter current plan (see comments)  [] Plan of care initiated [] Hold pending MD visit [] Discharge    Electronically signed by: Ivania Chen PT DPT    Note: If patient does not return for scheduled/ recommended follow up visits, this note will serve as a discharge from care along with most recent update on progress.

## 2022-12-30 DIAGNOSIS — R35.0 URINARY FREQUENCY: ICD-10-CM

## 2022-12-30 DIAGNOSIS — E78.00 HYPERCHOLESTEROLEMIA: ICD-10-CM

## 2022-12-30 DIAGNOSIS — Z01.419 WELL WOMAN EXAM: ICD-10-CM

## 2022-12-30 LAB
A/G RATIO: 1.3 (ref 1.1–2.2)
ALBUMIN SERPL-MCNC: 4.1 G/DL (ref 3.4–5)
ALP BLD-CCNC: 117 U/L (ref 40–129)
ALT SERPL-CCNC: 9 U/L (ref 10–40)
ANION GAP SERPL CALCULATED.3IONS-SCNC: 12 MMOL/L (ref 3–16)
AST SERPL-CCNC: 17 U/L (ref 15–37)
BACTERIA: NORMAL /HPF
BILIRUB SERPL-MCNC: 0.5 MG/DL (ref 0–1)
BILIRUBIN URINE: NEGATIVE
BLOOD, URINE: NEGATIVE
BUN BLDV-MCNC: 10 MG/DL (ref 7–20)
CALCIUM SERPL-MCNC: 9.6 MG/DL (ref 8.3–10.6)
CHLORIDE BLD-SCNC: 102 MMOL/L (ref 99–110)
CHOLESTEROL, TOTAL: 255 MG/DL (ref 0–199)
CLARITY: ABNORMAL
CO2: 27 MMOL/L (ref 21–32)
COLOR: YELLOW
CREAT SERPL-MCNC: 0.6 MG/DL (ref 0.6–1.1)
EPITHELIAL CELLS, UA: 1 /HPF (ref 0–5)
GFR SERPL CREATININE-BSD FRML MDRD: >60 ML/MIN/{1.73_M2}
GLUCOSE BLD-MCNC: 90 MG/DL (ref 70–99)
GLUCOSE URINE: NEGATIVE MG/DL
HCT VFR BLD CALC: 42.4 % (ref 36–48)
HDLC SERPL-MCNC: 44 MG/DL (ref 40–60)
HEMOGLOBIN: 14 G/DL (ref 12–16)
HYALINE CASTS: 0 /LPF (ref 0–8)
KETONES, URINE: NEGATIVE MG/DL
LDL CHOLESTEROL CALCULATED: 161 MG/DL
LEUKOCYTE ESTERASE, URINE: NEGATIVE
MCH RBC QN AUTO: 29.3 PG (ref 26–34)
MCHC RBC AUTO-ENTMCNC: 32.9 G/DL (ref 31–36)
MCV RBC AUTO: 88.9 FL (ref 80–100)
MICROSCOPIC EXAMINATION: YES
NITRITE, URINE: NEGATIVE
PDW BLD-RTO: 13 % (ref 12.4–15.4)
PH UA: 6 (ref 5–8)
PLATELET # BLD: 312 K/UL (ref 135–450)
PMV BLD AUTO: 7.3 FL (ref 5–10.5)
POTASSIUM SERPL-SCNC: 3.6 MMOL/L (ref 3.5–5.1)
PROTEIN UA: NEGATIVE MG/DL
RBC # BLD: 4.77 M/UL (ref 4–5.2)
RBC UA: 1 /HPF (ref 0–4)
SODIUM BLD-SCNC: 141 MMOL/L (ref 136–145)
SPECIFIC GRAVITY UA: 1.02 (ref 1–1.03)
TOTAL PROTEIN: 7.3 G/DL (ref 6.4–8.2)
TRIGL SERPL-MCNC: 250 MG/DL (ref 0–150)
TSH SERPL DL<=0.05 MIU/L-ACNC: 2.18 UIU/ML (ref 0.27–4.2)
URINE TYPE: ABNORMAL
UROBILINOGEN, URINE: 0.2 E.U./DL
VLDLC SERPL CALC-MCNC: 50 MG/DL
WBC # BLD: 5.4 K/UL (ref 4–11)
WBC UA: 0 /HPF (ref 0–5)

## 2022-12-31 LAB
ESTIMATED AVERAGE GLUCOSE: 99.7 MG/DL
HBA1C MFR BLD: 5.1 %

## 2023-01-06 ENCOUNTER — HOSPITAL ENCOUNTER (OUTPATIENT)
Dept: PHYSICAL THERAPY | Age: 53
Setting detail: THERAPIES SERIES
Discharge: HOME | End: 2023-01-06
Payer: COMMERCIAL

## 2023-01-06 PROCEDURE — 97110 THERAPEUTIC EXERCISES: CPT

## 2023-01-06 PROCEDURE — 97140 MANUAL THERAPY 1/> REGIONS: CPT

## 2023-01-06 NOTE — FLOWSHEET NOTE
[address] [facility]  Phone: [phone]   Fax: [phone]    Physical Therapy Daily Treatment Note    Date:  2023     Patient Name:  Jarvis Ibanez    :  1970  MRN: [id number]  Medical Diagnosis:  Left foot pain [M79.672]  Displaced fracture of fifth metatarsal bone, left foot, initial encounter for closed fracture [S92.352A]  Treatment Diagnosis: L foot pain, decreased L ankle ROM and strength, impaired gait and ability to climb stairs, decreased standing and walking tolerance, L ankle swelling  Insurance/Certification information:  PT Insurance Information: BCBS: all codes billable, $30 copay, no coinsurance, 50 visit limit combined per lisa year, hard max, none used, no auth  Physician Information:  Melissa Mills MD    Plan of care signed (Y/N): [x]  Yes []  No     Date of Patient follow up with Physician: 22      Progress Report: []  Yes  [x]  No     Date Range for reporting period:  Beginnin2022 - signed  PN:   Ending:     Progress report due (10 Rx/or 30 days whichever is less): visit #10 or       Recertification due (POC duration/ or 90 days whichever is less): visit #18 or 23     Visit # Insurance Allowable Auth required? Date Range    50 combined  Hard max []  Yes  [x]  No N/a       Latex Allergy:  [x]NO      []YES  Preferred Language for Healthcare:   [x]English       []other:    Functional Scale:           Date assessed:  [facility] [place] physical FS primary measure score = 63; risk adjusted = 52  22    Pain level:  310     SUBJECTIVE:  Pt reports she has been trying to walk heel>toe but she has been having increased ankle pain.      OBJECTIVE: : Pt ambulating with decreased L ankle DF/PF    :    AROM     L R   Knee Flexion 133 130   Knee Extension -3 (hyperextension) -3   Dorsiflexion  12 14   Plantarflexion  56 62   Inversion  50 54   Eversion  34 with compensation into DF 32     Strength (0-5) / Myotomes Left   Right   Quads (L2-4) 5 5   Hamstrings 5 5   Ankle Dorsiflexion (L4-5) 5 * 5   Ankle Plantarflexion (S1-2) 5 5   Ankle Inversion 4  5   Ankle Eversion (S1-2) 4  5   Great Toe Extension (L5)               Girth (cm)       Figure 8 50 cm 49.4 cm     Stairs: 4\" steps (6\" not available): 4 steps x 2 trials   1st trial: up reciprocal, down nonreciprocal leading with R LE   2nd trial: up reciprocal, down nonreciprocal leading with L LE    12/29: Per Dr. Matilda Yoon note from 12/20  IMAGING: Xray's were reviewed, dated today in office,  3 views of the left  foot, and showed a healing 5th MT base fracture, healing well. Significant osteopenia. PT observation: pt with less antalgic gait, but still landing with flat foot instead of heel strike     1/6: Pt with WNL PROM L ankle, normal mobility in forefoot and mid foot, normal joint mobility at L TCJ, pain with passive toe flexion but WNL, no pain and WNL toe extension. Pt demo's good heel strike but decreased toe off B/L.  Bunion noted R great toe       RESTRICTIONS/PRECAUTIONS: 5th met fracture = 7/11/22    Exercises/Interventions:     Therapeutic Exercises (59537) Resistance / level Sets/sec Reps Notes   Bike  SciFit Stepper / Nustep   L 1 / L 3  5'     IB - gastroc  IB- soleus   2 27''  30\" B    HR - neutral  30\"     HSS  2 30\" B    4 way ankle  LIME  12/2 ^           Soleus press -BLE 30#    Leg press SL 35#    Toe off extension stretch - with elevated plinth for balance  5-10\" holds 15 B                  Therapeutic Activities (79190)                                   Gait (57905)                     Stairs - step ups fwd 8\" B UE support   Stairs - step ups lat 8\" B UE support    Stairs - fwd step downs 4\"    Ekaterina- 1 foot step over with emphasis on heel strike/toe off 1 ekaterina    Fwd step ups onto BOSU  Blue side 1 10 B Light UE assist    Stairs - up/down 4\"  6\"           Neuromuscular Re-ed (47862)       Single leg balance Airex  12/6: Fingertip support needed L, no UE support on R   Small fitter board a/p, R/L, CW      Standing BAPS board    All directions   Tiltboard  M/L taps  1 min ea     Tiltboard  M/L balance  1 min ea     Tandem stance Airex  12/6: intermittent fingertip support   NBOS EC Airex  12/6: Fingertip support   Squats on BOSU Blue side     Medial/Lateral weight shifts on BOSU Black side                    Manual Intervention (54735)       PROM L ankle , STM to plantar fascia, TC mobs, toe flex/extension stretching   8 min                                             Modalities: *Pt does not like vaso*    11/11: good candidate for vaso    11/29, 12/2 : vaso L ankle  10 min , low pressure, moderate temp in reclined sitting     Girth (cm) L - pre vaso  L - post-vaso   Figure 8 51.5 cm 48.5 cm     1/6: CP (2x pillowcases) to L ankle in reclined sitting x 10 min     Pt. Education:  11/11/2022  -patient educated on diagnosis, prognosis and expectations for rehab  -all patient questions were answered  -educated on compression (wear, wash, what to look out for when wearing), ice, coming down stairs in neutral instead of sideways     Home Exercise Program:  Access Code: [email or web address]  URL: [email or web address]  Date: 11/11/2022  Prepared by: Velia Jett     Exercises  Supine Heel Slide - 2-3 x daily - 7 x weekly - 1 sets - 10 reps  Supine Quad Set - 2-3 x daily - 7 x weekly - 3 sets - 10 reps  Seated Heel Raise - 2-3 x daily - 7 x weekly - 3 sets - 10 reps  Seated Toe Raise - 2-3 x daily - 7 x weekly - 3 sets - 10 reps  Seated Ankle Circles - 2-3 x daily - 7 x weekly - 3 sets - 10 reps  Towel Scrunches - 2-3 x daily - 7 x weekly - 3 sets - 10 reps  Ankle Inversion Eversion Towel Slide - 2-3 x daily - 7 x weekly - 3 sets - 10     Access Code: [email or web address]  URL: [email or web address]  Date: 11/18/2022  Prepared by: Rohan Khan    Exercises  Long Sitting Ankle Eversion with Resistance - 1 x daily - 7 x weekly - 3 sets - 10 reps  Long Sitting Ankle  Inversion with Resistance - 1 x daily - 7 x weekly - 3 sets - 10 reps  Long Sitting Ankle Dorsiflexion with Anchored Resistance - 1 x daily - 7 x weekly - 3 sets - 10 reps  Long Sitting Ankle Plantar Flexion with Resistance - 1 x daily - 7 x weekly - 3 sets - 10 reps    Therapeutic Exercise and NMR EXR  [x] (08859) Provided verbal/tactile cueing for activities related to strengthening, flexibility, endurance, ROM for improvements in  [x] LE / Lumbar: LE, proximal hip, and core control with self care, mobility, lifting, ambulation. [] UE / Cervical: cervical, postural, scapular, scapulothoracic and UE control with self care, reaching, carrying, lifting, house/yardwork, driving, computer work. [x] (42666) Provided verbal/tactile cueing for activities related to improving balance, coordination, kinesthetic sense, posture, motor skill, proprioception to assist with   [x] LE / lumbar: LE, proximal hip, and core control in self care, mobility, lifting, ambulation and eccentric single leg control. [] UE / cervical: cervical, scapular, scapulothoracic and UE control with self care, reaching, carrying, lifting, house/yardwork, driving, computer work.   [] (61731) Therapist is in constant attendance of 2 or more patients providing skilled therapy interventions, but not providing any significant amount of measurable one-on-one time to either patient, for improvements in  [] LE / lumbar: LE, proximal hip, and core control in self care, mobility, lifting, ambulation and eccentric single leg control. [] UE / cervical: cervical, scapular, scapulothoracic and UE control with self care, reaching, carrying, lifting, house/yardwork, driving, computer work.      NMR and Therapeutic Activities:    [x] (08472 or 18593) Provided verbal/tactile cueing for activities related to improving balance, coordination, kinesthetic sense, posture, motor skill, proprioception and motor activation to allow for proper function of   [x] LE: / Lumbar core, proximal hip and LE with self care and ADLs  [] UE / Cervical: cervical, postural, scapular, scapulothoracic and UE control with self care, carrying, lifting, driving, computer work. [x] (61397) Gait Re-education- Provided training and instruction to the patient for proper LE, core and proximal hip recruitment and positioning and eccentric body weight control with ambulation re-education including up and down stairs     Home Management Training / Self Care:  [] (83967) Provided self-care/home management training related to activities of daily living and compensatory training, and/or use of adaptive equipment for improvement with: ADLs and compensatory training, meal preparation, safety procedures and instruction in use of adaptive equipment, including bathing, grooming, dressing, personal hygiene, basic household cleaning and chores.      Home Exercise Program:    [] (86176) Reviewed/Progressed HEP activities related to strengthening, flexibility, endurance, ROM of   [] LE / Lumbar: core, proximal hip and LE for functional self-care, mobility, lifting and ambulation/stair navigation   [] UE / Cervical: cervical, postural, scapular, scapulothoracic and UE control with self care, reaching, carrying, lifting, house/yardwork, driving, computer work  [] (23547)Reviewed/Progressed HEP activities related to improving balance, coordination, kinesthetic sense, posture, motor skill, proprioception of   [] LE: core, proximal hip and LE for self care, mobility, lifting, and ambulation/stair navigation    [] UE / Cervical: cervical, postural,  scapular, scapulothoracic and UE control with self care, reaching, carrying, lifting, house/yardwork, driving, computer work    Manual Treatments:  PROM / STM / Oscillations-Mobs:  G-I, II, III, IV (PA's, Inf., Post.)  [] (74512) Provided manual therapy to mobilize LE, proximal hip and/or LS spine soft tissue/joints for the purpose of modulating pain, promoting relaxation, increasing ROM, reducing/eliminating soft tissue swelling/inflammation/restriction, improving soft tissue extensibility and allowing for proper ROM for normal function with   [] LE / lumbar: self care, mobility, lifting and ambulation. [] UE / Cervical: self care, reaching, carrying, lifting, house/yardwork, driving, computer work. Modalities:  [] (12425) Vasopneumatic compression: Utilized vasopneumatic compression to decrease edema / swelling for the purpose of improving mobility and quad tone / recruitment which will allow for increased overall function including but not limited to self-care, transfers, ambulation, and ascending / descending stairs. Charges:  Timed Code Treatment Minutes: 40   Total Treatment Minutes: 50     [] EVAL - LOW (91845)   [] EVAL - MOD (53814)  [] EVAL - HIGH (25644)  [] RE-EVAL (65498)  [x] ZI(81083) x  2     [] Ionto  [] NMR (15594) x       [] Vaso  [x] Manual (09888) x 1      [] Ultrasound  [] TA x 1       [] Mech Traction (82789)  [] Aquatic Therapy x     [] ES (un) (42070):   [] Home Management Training x  [] ES(attended) (36318)   [] Dry Needling 1-2 muscles (23042):  [] Dry Needling 3+ muscles (356248)  [] Group:      [] Other: Gait x 2    GOALS:   Patient stated goal: \"back to walking\" and \"back to normal life\"   [] Progressing: [] Met: [] Not Met: [] Adjusted     Therapist goals for Patient:   Short Term Goals: To be achieved in: 2 weeks  1. Independent in HEP and progression per patient tolerance, in order to prevent re-injury. [] Progressing: [x] Met: [] Not Met: [] Adjusted  2. Patient will have a decrease in pain to facilitate improvement in movement, function, and ADLs as indicated by Functional Deficits. [x] Progressing: [] Met: [] Not Met: [] Adjusted     Long Term Goals: To be achieved in: 8 weeks  1. FOTO score of at least 71 to assist with reaching prior level of function. [] Progressing: [] Met: [] Not Met: [x] Adjusted- New goal as of 12/16  2. Patient will demonstrate increased L ankle DF AROM to 8 deg or greater to allow for increased heel strike during gait and less difficulty with descending stairs. [] Progressing: [x] Met: [] Not Met: [] Adjusted  3. Patient will demonstrate an increase in L gross ankle strength to at least 5/5 for improved ankle stability  with functional tasks such as walking and stairs. [x] Progressing: [] Met: [] Not Met: [] Adjusted  4. Patient will return to functional activities including climbing a full flight of stairs with 1-2 HR and reciprocal pattern to promote independence and safety at home. [x] Progressing: [] Met: [] Not Met: [] Adjusted  5. Patient to demonstrate normal gait including heel strike and toe off on L with equal step length without AD and ability to complete 6 min walk test to improve endurance and return to PLOF. [x] Progressing: [] Met: [] Not Met: [] Adjusted         Overall Progression Towards Functional goals/ Treatment Progress Update:  [] Patient is progressing as expected towards functional goals listed. [] Progression is slowed due to complexities/Impairments listed. [] Progression has been slowed due to co-morbidities. [x] Plan just implemented, too soon to assess goals progression <30days   [] Goals require adjustment due to lack of progress  [] Patient is not progressing as expected and requires additional follow up with physician  [] Other    Persisting Functional Limitations/Impairments:  []Sleeping []Sitting               [x]Standing []Transfers        [x]Walking []Kneeling               [x]Stairs []Squatting / bending   []ADLs []Reaching  [x]Lifting  []Housework  []Driving []Job related tasks  []Sports/Recreation []Other:        ASSESSMENT:  Pt reported pain with normalized gait pattern which was emphasized after the previous session. Pt demo's good heel strike but decreased toe off B/L. She has good mobility throughout forefoot and mid foot as well as good PROM of ankle. Still weak in INV/EV so added weight bearing activities on unstable surface to challenge those muscle groups. Reiterated several times that this process could take up to one year from fracture until patient feels \"normal\" again. Pt to benefit from continued therapy to address these deficits, normalize gait, and return to PLOF. Treatment/Activity Tolerance:  [x] Patient able to complete tx [] Patient limited by fatigue  [] Patient limited by pain  [] Patient limited by other medical complications  [] Other:     Prognosis: [x] Good [] Fair  [] Poor    Patient Requires Follow-up: [x] Yes  [] No    Plan for next treatment session:    PLAN: See eval. PT 1-2x / week for 8 weeks. [x] Continue per plan of care [] Alter current plan (see comments)  [] Plan of care initiated [] Hold pending MD visit [] Discharge    Electronically signed by: Glendora Saint, PT DPT    Note: If patient does not return for scheduled/ recommended follow up visits, this note will serve as a discharge from care along with most recent update on progress.

## 2023-01-13 ENCOUNTER — HOSPITAL ENCOUNTER (OUTPATIENT)
Dept: PHYSICAL THERAPY | Age: 53
Setting detail: THERAPIES SERIES
Discharge: HOME | End: 2023-01-13
Payer: COMMERCIAL

## 2023-01-13 PROCEDURE — 97110 THERAPEUTIC EXERCISES: CPT

## 2023-01-13 PROCEDURE — 97116 GAIT TRAINING THERAPY: CPT

## 2023-01-13 NOTE — PLAN OF CARE
[facility]  Phone: [phone]   Fax: [phone]  Physical Therapy Re-Certification Plan of Care    Dear Loretta Jose MD  ,    We had the pleasure of treating the following patient for physical therapy services at [facility]. A summary of our findings can be found in the updated assessment below. This includes our plan of care. If you have any questions or concerns regarding these findings, please do not hesitate to contact me at the office phone number checked above. Thank you for the referral.     Physician Signature:________________________________Date:__________________  By signing above (or electronic signature), therapist's plan is approved by physician      Functional Outcome:     FOTO: 65    6 min Walk Test: 5806 ft, no AD, pain reported about 2'45\" mar    1105 ft on 6 mWT with pain around 2'45\" nav - pain 4-5/10 after    Overall Response to Treatment:   []Patient is responding well to treatment and improvement is noted with regards  to goals   []Patient should continue to improve in reasonable time if they continue HEP   []Patient has plateaued and is no longer responding to skilled PT intervention    []Patient is getting worse and would benefit from return to referring MD   []Patient unable to adhere to initial POC   [x]Other: Pt is a 46year old female referred to PT following a displaced 5th metatarsal fracture sustained in July 2022. She has made considerable progress with both ROM and strength of the L foot and ankle. She is still limited in her gait and with descending stairs. She has a hard time with heel strike and toe off during gait. She also has decreased eccentric control when coming down the stairs. Pt would benefit from continued therapy to address these remaining deficits and return pt to PLOF.      GOALS:   Patient stated goal: \"back to walking\" and \"back to normal life\"   [] Progressing: [] Met: [] Not Met: [] Adjusted     Therapist goals for Patient:   Short Term Goals: To be achieved in: 2 weeks  1. Independent in HEP and progression per patient tolerance, in order to prevent re-injury. [] Progressing: [x] Met: [] Not Met: [] Adjusted  2. Patient will have a decrease in pain to facilitate improvement in movement, function, and ADLs as indicated by Functional Deficits. [x] Progressing: [] Met: [] Not Met: [] Adjusted     Long Term Goals: To be achieved in: 8 weeks  1. FOTO score of at least 71 to assist with reaching prior level of function. [] Progressing: [] Met: [] Not Met: [x] Adjusted- New goal as of   2. Patient will demonstrate increased L ankle DF AROM to 8 deg or greater to allow for increased heel strike during gait and less difficulty with descending stairs. [] Progressing: [x] Met: [] Not Met: [] Adjusted  3. Patient will demonstrate an increase in L gross ankle strength to at least 5/5 for improved ankle stability  with functional tasks such as walking and stairs. [] Progressing: [x] Met: [] Not Met: [] Adjusted  4. Patient will return to functional activities including climbing a full flight of stairs with 1-2 HR and reciprocal pattern to promote independence and safety at home. [x] Progressing: [] Met: [] Not Met: [] Adjusted  5. Patient to demonstrate normal gait including heel strike and toe off on L with equal step length without AD and ability to complete 6 min walk test to improve endurance and return to PLOF.    [x] Progressing: [] Met: [] Not Met: [] Adjusted    Date range of Visits: 22 - 23  Total Visits: 9    Recommendation:    [x]Continue PT 1x / wk for 7 weeks - continue current POC              []Hold PT, pending MD visit      Physical Therapy Daily Treatment Note    Date:  2023     Patient Name:  Wilian Elias    :  1970  MRN: [id number]  Medical Diagnosis:  Left foot pain [M79.672]  Displaced fracture of fifth metatarsal bone, left foot, initial encounter for closed fracture [S92.352A]  Treatment Diagnosis: L foot pain, decreased L ankle ROM and strength, impaired gait and ability to climb stairs, decreased standing and walking tolerance, L ankle swelling  Insurance/Certification information:  PT Insurance Information: BCBS: all codes billable, $30 copay, no coinsurance, 50 visit limit combined per lisa year, hard max, none used, no auth  Physician Information:  Merlin Amsterdam, MD    Plan of care signed (Y/N): [x]  Yes []  No     Date of Patient follow up with Physician: 22      Progress Report: [x]  Yes  []  No     Date Range for reporting period:  Beginnin2022 - signed  PN:   POC: 23  Ending:     Progress report due (10 Rx/or 30 days whichever is less): visit #16 or 87    Recertification due (POC duration/ or 90 days whichever is less): visit #16 or 23    Visit # Insurance Allowable Auth required? Date Range    50 combined  Hard max []  Yes  [x]  No N/a       Latex Allergy:  [x]NO      []YES  Preferred Language for Healthcare:   [x]English       []other:        Functional Scale:           Date assessed:  FOTO physical FS primary measure score = 63; risk adjusted = 52  22  FOTO physical FS primary measure score = 65      23    Pain level:  310     SUBJECTIVE:   Pt reports she is doing well today. Reports she had some pain last week, but pain is down this week.     OBJECTIVE: : Pt ambulating with decreased L ankle DF/PF    23:   AROM     L R   Knee Flexion 133 130   Knee Extension -3 (hyperextension) -3   Dorsiflexion  12 14   Plantarflexion  54 62   Inversion  50 54   Eversion  26 without compensation 32     Strength (0-5) / Myotomes Left   Right   Quads (L2-4) 5 5   Hamstrings 5 5   Ankle Dorsiflexion (L4-5) 5 5   Ankle Plantarflexion (S1-2) 5 5   Ankle Inversion 5 5   Ankle Eversion (S1-2) 5 5   Great Toe Extension (L5)                 Stairs: full flight, no HR reciprocal pattern. Decreased eccentric control with descent. 12/29: Per Dr. Irena Taylor note from 12/20  IMAGING: Stephanie Ordoñez were reviewed, dated today in office,  3 views of the left  foot, and showed a healing 5th MT base fracture, healing well. Significant osteopenia. PT observation: pt with less antalgic gait, but still landing with flat foot instead of heel strike     1/6: Pt with WNL PROM L ankle, normal mobility in forefoot and mid foot, normal joint mobility at L TCJ, pain with passive toe flexion but WNL, no pain and WNL toe extension. Pt demo's good heel strike but decreased toe off B/L.  Bunion noted R great toe       RESTRICTIONS/PRECAUTIONS: 5th met fracture = 7/11/22    Exercises/Interventions:     Therapeutic Exercises (44238) Resistance / level Sets/sec Reps Notes   Bike  SciFit Stepper / Nustep   L 1 / L 3  5'      IB - gastroc  IB- soleus   2 27''  30\" B    HR - neutral  30\"     HSS     4 way ankle  LIME  12/2 ^           Soleus press -BLE 30#    Leg press SL 35#    Toe off extension stretch - with elevated plinth for balance     Assessment for POC including ROM and MMT, FOTO, 6 min walk test 1/13             Therapeutic Activities (76254)                                   Gait (00966)                     Stairs - step ups fwd 8\" B UE support   Stairs - step ups lat 8\" B UE support    Stairs - fwd step downs 6\" 2 10 B    Ekaterina- 1 foot step over with emphasis on heel strike/toe off 1 ekaterina    Fwd step ups onto BOSU  Blue side 1 10 B Light UE assist    Stairs - up/down 4\"  6\" 1 flight in [facility]           Neuromuscular Re-ed (36090)       Single leg balance Airex  12/6: Fingertip support needed L, no UE support on R   Small fitter board a/p, R/L, CW      Standing BAPS board    All directions   Tiltboard  M/L taps      Tiltboard  M/L balance      Tandem stance Airex  12/6: intermittent fingertip support   NBOS EC Airex  12/6: Fingertip support   Squats on BOSU Blue side     Medial/Lateral weight shifts on BOSU Black side                    Manual Intervention (11962)       PROM L ankle , STM to plantar fascia, TC mobs, toe flex/extension stretching                                              Modalities: *Pt does not like vaso*    11/11: good candidate for vaso    11/29, 12/2 : vaso L ankle  10 min , low pressure, moderate temp in reclined sitting     Girth (cm) L - pre vaso  L - post-vaso   Figure 8 51.5 cm 48.5 cm     1/6: CP (2x pillowcases) to L ankle in reclined sitting x 10 min     Pt. Education:  11/11/2022  -patient educated on diagnosis, prognosis and expectations for rehab  -all patient questions were answered  -educated on compression (wear, wash, what to look out for when wearing), ice, coming down stairs in neutral instead of sideways     Home Exercise Program:  Access Code: [email or web address]  URL: [email or web address]  Date: 11/11/2022  Prepared by: Chalo Dumont     Exercises  Supine Heel Slide - 2-3 x daily - 7 x weekly - 1 sets - 10 reps  Supine Quad Set - 2-3 x daily - 7 x weekly - 3 sets - 10 reps  Seated Heel Raise - 2-3 x daily - 7 x weekly - 3 sets - 10 reps  Seated Toe Raise - 2-3 x daily - 7 x weekly - 3 sets - 10 reps  Seated Ankle Circles - 2-3 x daily - 7 x weekly - 3 sets - 10 reps  Towel Scrunches - 2-3 x daily - 7 x weekly - 3 sets - 10 reps  Ankle Inversion Eversion Towel Slide - 2-3 x daily - 7 x weekly - 3 sets - 10     Access Code: [email or web address]  URL: [email or web address]  Date: 11/18/2022  Prepared by: Richard Stapleton    Exercises  Long Sitting Ankle Eversion with Resistance - 1 x daily - 7 x weekly - 3 sets - 10 reps  Long Sitting Ankle Inversion with Resistance - 1 x daily - 7 x weekly - 3 sets - 10 reps  Long Sitting Ankle Dorsiflexion with Anchored Resistance - 1 x daily - 7 x weekly - 3 sets - 10 reps  Long Sitting Ankle Plantar Flexion with Resistance - 1 x daily - 7 x weekly - 3 sets - 10 reps    Therapeutic Exercise and NMR EXR  [x] (54363) Provided verbal/tactile cueing for activities related to strengthening, flexibility, endurance, ROM for improvements in  [x] LE / Lumbar: LE, proximal hip, and core control with self care, mobility, lifting, ambulation. [] UE / Cervical: cervical, postural, scapular, scapulothoracic and UE control with self care, reaching, carrying, lifting, house/yardwork, driving, computer work.  [] (36887) Provided verbal/tactile cueing for activities related to improving balance, coordination, kinesthetic sense, posture, motor skill, proprioception to assist with   [] LE / lumbar: LE, proximal hip, and core control in self care, mobility, lifting, ambulation and eccentric single leg control. [] UE / cervical: cervical, scapular, scapulothoracic and UE control with self care, reaching, carrying, lifting, house/yardwork, driving, computer work.   [] (07981) Therapist is in constant attendance of 2 or more patients providing skilled therapy interventions, but not providing any significant amount of measurable one-on-one time to either patient, for improvements in  [] LE / lumbar: LE, proximal hip, and core control in self care, mobility, lifting, ambulation and eccentric single leg control. [] UE / cervical: cervical, scapular, scapulothoracic and UE control with self care, reaching, carrying, lifting, house/yardwork, driving, computer work. NMR and Therapeutic Activities:    [] (83358 or 98947) Provided verbal/tactile cueing for activities related to improving balance, coordination, kinesthetic sense, posture, motor skill, proprioception and motor activation to allow for proper function of   [] LE: / Lumbar core, proximal hip and LE with self care and ADLs  [] UE / Cervical: cervical, postural, scapular, scapulothoracic and UE control with self care, carrying, lifting, driving, computer work.    [x] (05462) Gait Re-education- Provided training and instruction to the patient for proper LE, core and proximal hip recruitment and positioning and eccentric body weight control with ambulation re-education including up and down stairs     Home Management Training / Self Care:  [] (35156) Provided self-care/home management training related to activities of daily living and compensatory training, and/or use of adaptive equipment for improvement with: ADLs and compensatory training, meal preparation, safety procedures and instruction in use of adaptive equipment, including bathing, grooming, dressing, personal hygiene, basic household cleaning and chores. Home Exercise Program:    [] (51773) Reviewed/Progressed HEP activities related to strengthening, flexibility, endurance, ROM of   [] LE / Lumbar: core, proximal hip and LE for functional self-care, mobility, lifting and ambulation/stair navigation   [] UE / Cervical: cervical, postural, scapular, scapulothoracic and UE control with self care, reaching, carrying, lifting, house/yardwork, driving, computer work  [] (15616)Reviewed/Progressed HEP activities related to improving balance, coordination, kinesthetic sense, posture, motor skill, proprioception of   [] LE: core, proximal hip and LE for self care, mobility, lifting, and ambulation/stair navigation    [] UE / Cervical: cervical, postural,  scapular, scapulothoracic and UE control with self care, reaching, carrying, lifting, house/yardwork, driving, computer work    Manual Treatments:  PROM / STM / Oscillations-Mobs:  G-I, II, III, IV (PA's, Inf., Post.)  [] (01637) Provided manual therapy to mobilize LE, proximal hip and/or LS spine soft tissue/joints for the purpose of modulating pain, promoting relaxation,  increasing ROM, reducing/eliminating soft tissue swelling/inflammation/restriction, improving soft tissue extensibility and allowing for proper ROM for normal function with   [] LE / lumbar: self care, mobility, lifting and ambulation. [] UE / Cervical: self care, reaching, carrying, lifting, house/yardwork, driving, computer work. Modalities:  [] (91455) Vasopneumatic compression: Utilized vasopneumatic compression to decrease edema / swelling for the purpose of improving mobility and quad tone / recruitment which will allow for increased overall function including but not limited to self-care, transfers, ambulation, and ascending / descending stairs. Charges:  Timed Code Treatment Minutes: 45   Total Treatment Minutes: 45     [] EVAL - LOW (47033)   [] EVAL - MOD (26288)  [] EVAL - HIGH (39689)  [] RE-EVAL (21840)  [x] CF(78507) x  2     [] Ionto  [] NMR (00579) x       [] Vaso  [] Manual (39750) x 1      [] Ultrasound  [] TA x 1       [] Mech Traction (32607)  [] Aquatic Therapy x     [] ES (un) (81539):   [] Home Management Training x  [] ES(attended) (62917)   [] Dry Needling 1-2 muscles (99993):  [] Dry Needling 3+ muscles (022483)  [] Group:      [x] Other: Gait x 1           Overall Progression Towards Functional goals/ Treatment Progress Update:  [] Patient is progressing as expected towards functional goals listed. [] Progression is slowed due to complexities/Impairments listed. [] Progression has been slowed due to co-morbidities. [x] Plan just implemented, too soon to assess goals progression <30days   [] Goals require adjustment due to lack of progress  [] Patient is not progressing as expected and requires additional follow up with physician  [] Other    Persisting Functional Limitations/Impairments:  []Sleeping []Sitting               [x]Standing []Transfers        [x]Walking []Kneeling               [x]Stairs []Squatting / bending   []ADLs []Reaching  [x]Lifting  []Housework  []Driving []Job related tasks  []Sports/Recreation []Other:        ASSESSMENT:  POC completed this date - see above. Discussed with pt purchasing a recumbent bike, suggested checking at [employer].      Treatment/Activity Tolerance:  [x] Patient able to complete tx [] Patient limited by fatigue  [] Patient limited by pain  [] Patient limited by other medical complications  [] Other:     Prognosis: [x] Good [] Fair  [] Poor    Patient Requires Follow-up: [x] Yes  [] No    Plan for next treatment session:    PLAN: See eval. PT 1-2x / week for 8 weeks. [x] Continue per plan of care [] Alter current plan (see comments)  [] Plan of care initiated [] Hold pending MD visit [] Discharge    Electronically signed by: Madeleine Estrella PT DPT    Note: If patient does not return for scheduled/ recommended follow up visits, this note will serve as a discharge from care along with most recent update on progress.

## 2023-01-20 ENCOUNTER — APPOINTMENT (OUTPATIENT)
Dept: PHYSICAL THERAPY | Age: 53
End: 2023-01-20
Payer: COMMERCIAL

## 2023-01-27 ENCOUNTER — HOSPITAL ENCOUNTER (OUTPATIENT)
Dept: PHYSICAL THERAPY | Age: 53
Setting detail: THERAPIES SERIES
Discharge: HOME | End: 2023-01-27
Payer: COMMERCIAL

## 2023-01-27 PROCEDURE — 97112 NEUROMUSCULAR REEDUCATION: CPT

## 2023-01-27 PROCEDURE — 97110 THERAPEUTIC EXERCISES: CPT

## 2023-01-27 PROCEDURE — 97530 THERAPEUTIC ACTIVITIES: CPT

## 2023-01-27 NOTE — PLAN OF CARE
[facility]  Phone: [phone]   Fax: [phone]        Physical Therapy Daily Treatment Note    Date:  2023     Patient Name:  Ester Magdaleno    :  1970  MRN: [id number]  Medical Diagnosis:  Left foot pain [M79.672]  Displaced fracture of fifth metatarsal bone, left foot, initial encounter for closed fracture [S92.352A]  Treatment Diagnosis: L foot pain, decreased L ankle ROM and strength, impaired gait and ability to climb stairs, decreased standing and walking tolerance, L ankle swelling  Insurance/Certification information:  PT Insurance Information: BCBS: all codes billable, $30 copay, no coinsurance, 50 visit limit combined per lisa year, hard max, none used, no auth  Physician Information:  Ignacio Springer MD    Plan of care signed (Y/N): [x]  Yes []  No     Date of Patient follow up with Physician: 22      Progress Report: [x]  Yes  []  No     Date Range for reporting period:  Beginnin2022 - signed  PN:   POC: 23  Ending:     Progress report due (10 Rx/or 30 days whichever is less): visit #16 or     Recertification due (POC duration/ or 90 days whichever is less): visit #16 or 23    Visit # Insurance Allowable Auth required? Date Range   10/16 50 combined  Hard max []  Yes  [x]  No N/a       Latex Allergy:  [x]NO      []YES  Preferred Language for Healthcare:   [x]English       []other:        Functional Scale:           Date assessed:  FOTO physical FS primary measure score = 63; risk adjusted = 52  22  FOTO physical FS primary measure score = 65      23    Pain level:  1-2/10     SUBJECTIVE:   :  Pt with very minimal complaints of pain this date. States she is improving overall and feels that therapy is helping considerably. States she has been exercising at home and feels that she can walk almost normally now.       OBJECTIVE: : Pt still ambulating with decreased L ankle DF/PF however gait deviations are minimal.      1/13/23:   AROM     L R   Knee Flexion 133 130   Knee Extension -3 (hyperextension) -3   Dorsiflexion  12 14   Plantarflexion  54 62   Inversion  50 54   Eversion  26 without compensation 32     Strength (0-5) / Myotomes Left   Right   Quads (L2-4) 5 5   Hamstrings 5 5   Ankle Dorsiflexion (L4-5) 5 5   Ankle Plantarflexion (S1-2) 5 5   Ankle Inversion 5 5   Ankle Eversion (S1-2) 5 5   Great Toe Extension (L5)                 Stairs: full flight, no HR reciprocal pattern. Decreased eccentric control with descent. 12/29: Per Dr. Beatriz Bhatt note from 12/20  IMAGING: Dawne Aase were reviewed, dated today in office,  3 views of the left  foot, and showed a healing 5th MT base fracture, healing well. Significant osteopenia. PT observation: pt with less antalgic gait, but still landing with flat foot instead of heel strike     1/6: Pt with WNL PROM L ankle, normal mobility in forefoot and mid foot, normal joint mobility at L TCJ, pain with passive toe flexion but WNL, no pain and WNL toe extension. Pt demo's good heel strike but decreased toe off B/L.  Bunion noted R great toe       RESTRICTIONS/PRECAUTIONS: 5th met fracture = 7/11/22    Exercises/Interventions:     Therapeutic Exercises (03697) Resistance / level Sets/sec Reps Notes   Bike  SciFit Stepper / Nustep   L 1 / L 3  5'      IB - gastroc  IB- soleus   2 27''  30\" B    HR - neutral   2 x 30    HSS     4 way ankle  LIME  12/2 ^           Soleus press -BLE 30#    Leg press SL 35#    Toe off extension stretch - with elevated plinth for balance     Assessment for POC including ROM and MMT, FOTO, 6 min walk test 1/13             Therapeutic Activities (51185)                                   Gait (82249)                     Stairs - step ups fwd 8\" B UE support   Stairs - step ups lat 8\" B UE support    Stairs - fwd step downs/heel tap 4\"/6\" 2 10 B One set from each height   Ernie- 1 foot step over with emphasis on heel strike/toe off 1 ekaterina    Fwd step ups onto BOSU  Blue side 1 10 B Light UE assist    Stairs - up/down 4\"  6\" 1 flight in [facility]           Neuromuscular Re-ed (48372)       Single leg balance Airex  12/6: Fingertip support needed L, no UE support on R   Small fitter board a/p, R/L, CW      Standing BAPS board    All directions   Tiltboard  M/L taps      Tiltboard  M/L balance      Tandem stance Airex  12/6: intermittent fingertip support   NBOS EC Airex  12/6: Fingertip support   Squats on BOSU black side  10     Medial/Lateral weight shifts on BOSU Black side   10     Step up to SLS Bosu Blue side  X 10 B    Rebounder:  SLS with ball toss      Matrix: SLS with taps on arc     X 10 B      X 3 B   Yellow weighted ball   Manual Intervention (73521)       PROM L ankle , STM to plantar fascia, TC mobs, toe flex/extension stretching                                              Modalities: *Pt does not like vaso*    11/11: good candidate for vaso    11/29, 12/2 : vaso L ankle  10 min , low pressure, moderate temp in reclined sitting     Girth (cm) L - pre vaso  L - post-vaso   Figure 8 51.5 cm 48.5 cm     1/6: CP (2x pillowcases) to L ankle in reclined sitting x 10 min     Pt. Education:  11/11/2022  -patient educated on diagnosis, prognosis and expectations for rehab  -all patient questions were answered  -educated on compression (wear, wash, what to look out for when wearing), ice, coming down stairs in neutral instead of sideways     Home Exercise Program:  Access Code: [email or web address]  URL: [email or web address]  Date: 11/11/2022  Prepared by: Vipin Acosta     Exercises  Supine Heel Slide - 2-3 x daily - 7 x weekly - 1 sets - 10 reps  Supine Quad Set - 2-3 x daily - 7 x weekly - 3 sets - 10 reps  Seated Heel Raise - 2-3 x daily - 7 x weekly - 3 sets - 10 reps  Seated Toe Raise - 2-3 x daily - 7 x weekly - 3 sets - 10 reps  Seated Ankle Circles - 2-3 x daily - 7 x weekly - 3 sets - 10 reps  Towel Scrunches - 2-3 x daily - 7 x weekly - 3 sets - 10 reps  Ankle Inversion Eversion Towel Slide - 2-3 x daily - 7 x weekly - 3 sets - 10     Access Code: [email or web address]  URL: [email or web address]  Date: 11/18/2022  Prepared by: Josephine Lozada    Exercises  Long Sitting Ankle Eversion with Resistance - 1 x daily - 7 x weekly - 3 sets - 10 reps  Long Sitting Ankle Inversion with Resistance - 1 x daily - 7 x weekly - 3 sets - 10 reps  Long Sitting Ankle Dorsiflexion with Anchored Resistance - 1 x daily - 7 x weekly - 3 sets - 10 reps  Long Sitting Ankle Plantar Flexion with Resistance - 1 x daily - 7 x weekly - 3 sets - 10 reps    Therapeutic Exercise and NMR EXR  [x] (96173) Provided verbal/tactile cueing for activities related to strengthening, flexibility, endurance, ROM for improvements in  [x] LE / Lumbar: LE, proximal hip, and core control with self care, mobility, lifting, ambulation. [] UE / Cervical: cervical, postural, scapular, scapulothoracic and UE control with self care, reaching, carrying, lifting, house/yardwork, driving, computer work.  [] (73914) Provided verbal/tactile cueing for activities related to improving balance, coordination, kinesthetic sense, posture, motor skill, proprioception to assist with   [] LE / lumbar: LE, proximal hip, and core control in self care, mobility, lifting, ambulation and eccentric single leg control. [] UE / cervical: cervical, scapular, scapulothoracic and UE control with self care, reaching, carrying, lifting, house/yardwork, driving, computer work.   [] (81779) Therapist is in constant attendance of 2 or more patients providing skilled therapy interventions, but not providing any significant amount of measurable one-on-one time to either patient, for improvements in  [] LE / lumbar: LE, proximal hip, and core control in self care, mobility, lifting, ambulation and eccentric single leg control.    [] UE / cervical: cervical, scapular, scapulothoracic and UE control with self care, reaching, carrying, lifting, house/yardwork, driving, computer work. NMR and Therapeutic Activities:    [] (05029 or 56782) Provided verbal/tactile cueing for activities related to improving balance, coordination, kinesthetic sense, posture, motor skill, proprioception and motor activation to allow for proper function of   [] LE: / Lumbar core, proximal hip and LE with self care and ADLs  [] UE / Cervical: cervical, postural, scapular, scapulothoracic and UE control with self care, carrying, lifting, driving, computer work. [x] (35855) Gait Re-education- Provided training and instruction to the patient for proper LE, core and proximal hip recruitment and positioning and eccentric body weight control with ambulation re-education including up and down stairs     Home Management Training / Self Care:  [] (05127) Provided self-care/home management training related to activities of daily living and compensatory training, and/or use of adaptive equipment for improvement with: ADLs and compensatory training, meal preparation, safety procedures and instruction in use of adaptive equipment, including bathing, grooming, dressing, personal hygiene, basic household cleaning and chores.      Home Exercise Program:    [] (95740) Reviewed/Progressed HEP activities related to strengthening, flexibility, endurance, ROM of   [] LE / Lumbar: core, proximal hip and LE for functional self-care, mobility, lifting and ambulation/stair navigation   [] UE / Cervical: cervical, postural, scapular, scapulothoracic and UE control with self care, reaching, carrying, lifting, house/yardwork, driving, computer work  [] (84493)Reviewed/Progressed HEP activities related to improving balance, coordination, kinesthetic sense, posture, motor skill, proprioception of   [] LE: core, proximal hip and LE for self care, mobility, lifting, and ambulation/stair navigation    [] UE / Cervical: cervical, postural,  scapular, scapulothoracic and UE control with self care, reaching, carrying, lifting, house/yardwork, driving, computer work    Manual Treatments:  PROM / STM / Oscillations-Mobs:  G-I, II, III, IV (PA's, Inf., Post.)  [] (70787) Provided manual therapy to mobilize LE, proximal hip and/or LS spine soft tissue/joints for the purpose of modulating pain, promoting relaxation,  increasing ROM, reducing/eliminating soft tissue swelling/inflammation/restriction, improving soft tissue extensibility and allowing for proper ROM for normal function with   [] LE / lumbar: self care, mobility, lifting and ambulation. [] UE / Cervical: self care, reaching, carrying, lifting, house/yardwork, driving, computer work. Modalities:  [] (13850) Vasopneumatic compression: Utilized vasopneumatic compression to decrease edema / swelling for the purpose of improving mobility and quad tone / recruitment which will allow for increased overall function including but not limited to self-care, transfers, ambulation, and ascending / descending stairs. Charges:  Timed Code Treatment Minutes: 45   Total Treatment Minutes: 45     [] EVAL - LOW (08446)   [] EVAL - MOD (14243)  [] EVAL - HIGH (19822)  [] RE-EVAL (85881)  [x] SJ(31645) x  2     [] Ionto  [] NMR (89614) x       [] Vaso  [] Manual (75360) x 1      [] Ultrasound  [] TA x 1       [] Mech Traction (03516)  [] Aquatic Therapy x     [] ES (un) (70339):   [] Home Management Training x  [] ES(attended) (17160)   [] Dry Needling 1-2 muscles (97614):  [] Dry Needling 3+ muscles (697822)  [] Group:      [x] Other: Gait x 1           Overall Progression Towards Functional goals/ Treatment Progress Update:  [] Patient is progressing as expected towards functional goals listed. [] Progression is slowed due to complexities/Impairments listed. [] Progression has been slowed due to co-morbidities.   [x] Plan just implemented, too soon to assess goals progression <30days   [] Goals require adjustment due to lack of progress  [] Patient is not progressing as expected and requires additional follow up with physician  [] Other    Persisting Functional Limitations/Impairments:  []Sleeping []Sitting               [x]Standing []Transfers        [x]Walking []Kneeling               [x]Stairs []Squatting / bending   []ADLs []Reaching  [x]Lifting  []Housework  []Driving []Job related tasks  []Sports/Recreation []Other:        ASSESSMENT:  POC completed this date - see above. Discussed with pt purchasing a recumbent bike, suggested checking at [employer]. Treatment/Activity Tolerance:  [x] Patient able to complete tx [] Patient limited by fatigue  [] Patient limited by pain  [] Patient limited by other medical complications  [] Other:     Prognosis: [x] Good [] Fair  [] Poor    Patient Requires Follow-up: [x] Yes  [] No    Plan for next treatment session:    PLAN: See eval. PT 1-2x / week for 8 weeks. [x] Continue per plan of care [] Alter current plan (see comments)  [] Plan of care initiated [] Hold pending MD visit [] Discharge    Electronically signed by: Efrain White PT DPT    Note: If patient does not return for scheduled/ recommended follow up visits, this note will serve as a discharge from care along with most recent update on progress.

## 2023-11-17 ENCOUNTER — TELEPHONE (OUTPATIENT)
Dept: FAMILY MEDICINE CLINIC | Age: 53
End: 2023-11-17

## 2023-11-17 DIAGNOSIS — Z00.00 ROUTINE GENERAL MEDICAL EXAMINATION AT A HEALTH CARE FACILITY: Primary | ICD-10-CM

## 2023-11-17 NOTE — TELEPHONE ENCOUNTER
----- Message from Dino Castillo sent at 11/17/2023  4:19 PM EST -----  Subject: Referral Request    Reason for referral request? Pt would like to have labs and urine test for   appt on 12/12/2023   Provider patient wants to be referred to(if known):     Provider Phone Number(if known):     Additional Information for Provider?   ---------------------------------------------------------------------------  --------------  [address]    [phone]; OK to leave message on voicemail  ---------------------------------------------------------------------------  --------------

## 2023-12-12 ENCOUNTER — OFFICE VISIT (OUTPATIENT)
Dept: FAMILY MEDICINE CLINIC | Age: 53
End: 2023-12-12
Payer: COMMERCIAL

## 2023-12-12 VITALS
HEIGHT: 60 IN | WEIGHT: 144.2 LBS | HEART RATE: 99 BPM | DIASTOLIC BLOOD PRESSURE: 86 MMHG | SYSTOLIC BLOOD PRESSURE: 136 MMHG | TEMPERATURE: 97.3 F | OXYGEN SATURATION: 97 % | BODY MASS INDEX: 28.31 KG/M2 | RESPIRATION RATE: 14 BRPM

## 2023-12-12 DIAGNOSIS — M26.69 TMJ INFLAMMATION: ICD-10-CM

## 2023-12-12 DIAGNOSIS — R42 VERTIGO: ICD-10-CM

## 2023-12-12 DIAGNOSIS — M65.331 TRIGGER FINGER, RIGHT MIDDLE FINGER: ICD-10-CM

## 2023-12-12 DIAGNOSIS — Z01.419 WELL WOMAN EXAM: Primary | ICD-10-CM

## 2023-12-12 DIAGNOSIS — Z23 NEED FOR SHINGLES VACCINE: ICD-10-CM

## 2023-12-12 DIAGNOSIS — E78.00 HYPERCHOLESTEROLEMIA: ICD-10-CM

## 2023-12-12 PROBLEM — S92.352A DISPLACED FRACTURE OF FIFTH METATARSAL BONE, LEFT FOOT, INITIAL ENCOUNTER FOR CLOSED FRACTURE: Status: RESOLVED | Noted: 2022-11-08 | Resolved: 2023-12-12

## 2023-12-12 PROCEDURE — 99396 PREV VISIT EST AGE 40-64: CPT | Performed by: FAMILY MEDICINE

## 2023-12-12 RX ORDER — METHYLPREDNISOLONE 4 MG/1
TABLET ORAL
Qty: 21 TABLET | Refills: 0 | Status: SHIPPED | OUTPATIENT
Start: 2023-12-12

## 2023-12-12 SDOH — ECONOMIC STABILITY: INCOME INSECURITY: HOW HARD IS IT FOR YOU TO PAY FOR THE VERY BASICS LIKE FOOD, HOUSING, MEDICAL CARE, AND HEATING?: NOT HARD AT ALL

## 2023-12-12 SDOH — ECONOMIC STABILITY: HOUSING INSECURITY
IN THE LAST 12 MONTHS, WAS THERE A TIME WHEN YOU DID NOT HAVE A STEADY PLACE TO SLEEP OR SLEPT IN A SHELTER (INCLUDING NOW)?: NO

## 2023-12-12 SDOH — ECONOMIC STABILITY: FOOD INSECURITY: WITHIN THE PAST 12 MONTHS, YOU WORRIED THAT YOUR FOOD WOULD RUN OUT BEFORE YOU GOT MONEY TO BUY MORE.: NEVER TRUE

## 2023-12-12 SDOH — ECONOMIC STABILITY: FOOD INSECURITY: WITHIN THE PAST 12 MONTHS, THE FOOD YOU BOUGHT JUST DIDN'T LAST AND YOU DIDN'T HAVE MONEY TO GET MORE.: NEVER TRUE

## 2023-12-12 ASSESSMENT — PATIENT HEALTH QUESTIONNAIRE - PHQ9
SUM OF ALL RESPONSES TO PHQ9 QUESTIONS 1 & 2: 0
SUM OF ALL RESPONSES TO PHQ QUESTIONS 1-9: 0
SUM OF ALL RESPONSES TO PHQ QUESTIONS 1-9: 0
1. LITTLE INTEREST OR PLEASURE IN DOING THINGS: 0
2. FEELING DOWN, DEPRESSED OR HOPELESS: 0
SUM OF ALL RESPONSES TO PHQ QUESTIONS 1-9: 0
SUM OF ALL RESPONSES TO PHQ QUESTIONS 1-9: 0

## 2023-12-14 DIAGNOSIS — Z13.6 SCREENING, ISCHEMIC HEART DISEASE: Primary | ICD-10-CM

## 2023-12-14 LAB
HPV HR 12 DNA SPEC QL NAA+PROBE: NOT DETECTED
HPV16 DNA SPEC QL NAA+PROBE: NOT DETECTED
HPV16+18+H RISK 12 DNA SPEC-IMP: NORMAL
HPV18 DNA SPEC QL NAA+PROBE: NOT DETECTED

## 2023-12-21 LAB — MAMMOGRAPHY, EXTERNAL: NORMAL

## 2023-12-26 DIAGNOSIS — Z13.6 SCREENING, ISCHEMIC HEART DISEASE: ICD-10-CM

## 2024-12-13 ENCOUNTER — OFFICE VISIT (OUTPATIENT)
Dept: FAMILY MEDICINE CLINIC | Age: 54
End: 2024-12-13
Payer: COMMERCIAL

## 2024-12-13 VITALS
WEIGHT: 146.6 LBS | BODY MASS INDEX: 29.11 KG/M2 | OXYGEN SATURATION: 98 % | SYSTOLIC BLOOD PRESSURE: 110 MMHG | DIASTOLIC BLOOD PRESSURE: 68 MMHG | TEMPERATURE: 96.6 F | HEART RATE: 61 BPM

## 2024-12-13 DIAGNOSIS — Z23 NEED FOR SHINGLES VACCINE: ICD-10-CM

## 2024-12-13 DIAGNOSIS — Z12.31 ENCOUNTER FOR SCREENING MAMMOGRAM FOR MALIGNANT NEOPLASM OF BREAST: ICD-10-CM

## 2024-12-13 DIAGNOSIS — Z01.419 WELL WOMAN EXAM: ICD-10-CM

## 2024-12-13 DIAGNOSIS — Z01.419 WELL WOMAN EXAM: Primary | ICD-10-CM

## 2024-12-13 DIAGNOSIS — E78.00 HYPERCHOLESTEROLEMIA: ICD-10-CM

## 2024-12-13 DIAGNOSIS — N93.8 DUB (DYSFUNCTIONAL UTERINE BLEEDING): ICD-10-CM

## 2024-12-13 LAB
ALBUMIN SERPL-MCNC: 4.5 G/DL (ref 3.4–5)
ALBUMIN/GLOB SERPL: 1.7 {RATIO} (ref 1.1–2.2)
ALP SERPL-CCNC: 102 U/L (ref 40–129)
ALT SERPL-CCNC: 14 U/L (ref 10–40)
ANION GAP SERPL CALCULATED.3IONS-SCNC: 12 MMOL/L (ref 3–16)
AST SERPL-CCNC: 23 U/L (ref 15–37)
BASOPHILS # BLD: 0.1 K/UL (ref 0–0.2)
BASOPHILS NFR BLD: 1.3 %
BILIRUB SERPL-MCNC: 0.4 MG/DL (ref 0–1)
BILIRUB UR QL STRIP.AUTO: NEGATIVE
BUN SERPL-MCNC: 11 MG/DL (ref 7–20)
CALCIUM SERPL-MCNC: 9.6 MG/DL (ref 8.3–10.6)
CHLORIDE SERPL-SCNC: 102 MMOL/L (ref 99–110)
CHOLEST SERPL-MCNC: 258 MG/DL (ref 0–199)
CLARITY UR: CLEAR
CO2 SERPL-SCNC: 27 MMOL/L (ref 21–32)
COLOR UR: YELLOW
CREAT SERPL-MCNC: 0.6 MG/DL (ref 0.6–1.1)
DEPRECATED RDW RBC AUTO: 12.6 % (ref 12.4–15.4)
EOSINOPHIL # BLD: 0.2 K/UL (ref 0–0.6)
EOSINOPHIL NFR BLD: 3.7 %
ESTRADIOL SERPL-MCNC: <5 PG/ML
FSH SERPL-ACNC: 84.3 MIU/ML
GFR SERPLBLD CREATININE-BSD FMLA CKD-EPI: >90 ML/MIN/{1.73_M2}
GLUCOSE SERPL-MCNC: 90 MG/DL (ref 70–99)
GLUCOSE UR STRIP.AUTO-MCNC: NEGATIVE MG/DL
HCT VFR BLD AUTO: 40.9 % (ref 36–48)
HDLC SERPL-MCNC: 42 MG/DL (ref 40–60)
HGB BLD-MCNC: 13.9 G/DL (ref 12–16)
HGB UR QL STRIP.AUTO: NEGATIVE
KETONES UR STRIP.AUTO-MCNC: NEGATIVE MG/DL
LDLC SERPL CALC-MCNC: 164 MG/DL
LEUKOCYTE ESTERASE UR QL STRIP.AUTO: NEGATIVE
LH SERPL-ACNC: 36.5 MIU/ML
LYMPHOCYTES # BLD: 1.9 K/UL (ref 1–5.1)
LYMPHOCYTES NFR BLD: 34.8 %
MCH RBC QN AUTO: 30.5 PG (ref 26–34)
MCHC RBC AUTO-ENTMCNC: 34 G/DL (ref 31–36)
MCV RBC AUTO: 89.8 FL (ref 80–100)
MONOCYTES # BLD: 0.3 K/UL (ref 0–1.3)
MONOCYTES NFR BLD: 5.5 %
NEUTROPHILS # BLD: 3.1 K/UL (ref 1.7–7.7)
NEUTROPHILS NFR BLD: 54.7 %
NITRITE UR QL STRIP.AUTO: NEGATIVE
PH UR STRIP.AUTO: 5.5 [PH] (ref 5–8)
PLATELET # BLD AUTO: 292 K/UL (ref 135–450)
PMV BLD AUTO: 7.2 FL (ref 5–10.5)
POTASSIUM SERPL-SCNC: 3.7 MMOL/L (ref 3.5–5.1)
PROT SERPL-MCNC: 7.2 G/DL (ref 6.4–8.2)
PROT UR STRIP.AUTO-MCNC: NEGATIVE MG/DL
RBC # BLD AUTO: 4.56 M/UL (ref 4–5.2)
SODIUM SERPL-SCNC: 141 MMOL/L (ref 136–145)
SP GR UR STRIP.AUTO: 1.02 (ref 1–1.03)
TRIGL SERPL-MCNC: 258 MG/DL (ref 0–150)
TSH SERPL DL<=0.005 MIU/L-ACNC: 3.19 UIU/ML (ref 0.27–4.2)
UA DIPSTICK W REFLEX MICRO PNL UR: NORMAL
URN SPEC COLLECT METH UR: NORMAL
UROBILINOGEN UR STRIP-ACNC: 0.2 E.U./DL
VLDLC SERPL CALC-MCNC: 52 MG/DL
WBC # BLD AUTO: 5.6 K/UL (ref 4–11)

## 2024-12-13 PROCEDURE — 99396 PREV VISIT EST AGE 40-64: CPT | Performed by: FAMILY MEDICINE

## 2024-12-13 SDOH — ECONOMIC STABILITY: FOOD INSECURITY: WITHIN THE PAST 12 MONTHS, THE FOOD YOU BOUGHT JUST DIDN'T LAST AND YOU DIDN'T HAVE MONEY TO GET MORE.: NEVER TRUE

## 2024-12-13 SDOH — ECONOMIC STABILITY: FOOD INSECURITY: WITHIN THE PAST 12 MONTHS, YOU WORRIED THAT YOUR FOOD WOULD RUN OUT BEFORE YOU GOT MONEY TO BUY MORE.: NEVER TRUE

## 2024-12-13 SDOH — ECONOMIC STABILITY: INCOME INSECURITY: HOW HARD IS IT FOR YOU TO PAY FOR THE VERY BASICS LIKE FOOD, HOUSING, MEDICAL CARE, AND HEATING?: NOT HARD AT ALL

## 2024-12-13 ASSESSMENT — PATIENT HEALTH QUESTIONNAIRE - PHQ9
SUM OF ALL RESPONSES TO PHQ9 QUESTIONS 1 & 2: 0
SUM OF ALL RESPONSES TO PHQ QUESTIONS 1-9: 0
2. FEELING DOWN, DEPRESSED OR HOPELESS: NOT AT ALL
1. LITTLE INTEREST OR PLEASURE IN DOING THINGS: NOT AT ALL
SUM OF ALL RESPONSES TO PHQ QUESTIONS 1-9: 0

## 2024-12-13 NOTE — PROGRESS NOTES
bilaterally.  Nose/Sinuses - Nares normal. Septum midline. Mucosa normal. No drainage or sinus tenderness.  Oropharynx - Lips, mucosa, and tongue normal. Teeth and gums normal.   Neck - Neck supple. No adenopathy. Thyroid symmetric, normal size, no carotid bruit bilaterally  Back - Back symmetric, no curvature. Range of motion normal. No Costovertebral angle tenderness.  Lungs - Percussion normal. Good diaphragmatic excursion. Lungs clear without wheeze, rales, crackles  Heart - Regular rate and rhythm, with no rub, murmur or gallop noted.  Abdomen - Abdomen soft, non-tender. Bowel sounds normal. No masses, tenderness or organomegaly  Breasts: breasts appear normal, no suspicious masses, no skin or nipple changes or axillary nodes. Self exam is encouraged.   Pelvis: normal external genitalia, vulva, vagina, cervix, uterus and adnexa, PAP: Pap smear done today, thin-prep method, HPV test.   Extremities - Extremities normal. No deformities, edema, or skin discoloration  Musculoskeletal - Spine ROM normal. Muscular strength intact.  Peripheral pulses - radial=4/4,, femoral=4/4, popliteal=4/4, dorsalis pedis=4/4,  Neuro - Gait normal. Reflexes normal and symmetric. Sensation grossly normal.  No focal weakness  Psych - euthymic, no suicidal thoughts or ideation, mood stable.      Exam chaperoned by female assistant.  Maryjane        No current outpatient medications on file.     No current facility-administered medications for this visit.         ASSESSMENT / PLAN:    1. Well woman exam  No focal abnormalities on exam  Anticipatory guidance discussed.  Pap/pelvic/breast exam today  Check bloodwork as below  - CBC with Auto Differential; Future  - Comprehensive Metabolic Panel; Future  - Lipid Panel; Future  - TSH; Future  - Hemoglobin A1C; Future  - Luteinizing Hormone; Future  - Follicle Stimulating Hormone; Future  - Estradiol; Future  - Urinalysis; Future  - PAP SMEAR    2. Hypercholesterolemia  Check cmp, lipids  Normal

## 2024-12-14 LAB
EST. AVERAGE GLUCOSE BLD GHB EST-MCNC: 99.7 MG/DL
HBA1C MFR BLD: 5.1 %
